# Patient Record
Sex: FEMALE | Race: WHITE | NOT HISPANIC OR LATINO | Employment: PART TIME | URBAN - NONMETROPOLITAN AREA
[De-identification: names, ages, dates, MRNs, and addresses within clinical notes are randomized per-mention and may not be internally consistent; named-entity substitution may affect disease eponyms.]

---

## 2017-03-22 ENCOUNTER — APPOINTMENT (OUTPATIENT)
Dept: URGENT CARE | Facility: PHYSICIAN GROUP | Age: 32
End: 2017-03-22
Payer: MEDICAID

## 2017-03-23 ENCOUNTER — OFFICE VISIT (OUTPATIENT)
Dept: MEDICAL GROUP | Facility: CLINIC | Age: 32
End: 2017-03-23
Payer: MEDICAID

## 2017-03-23 VITALS
HEIGHT: 62 IN | DIASTOLIC BLOOD PRESSURE: 62 MMHG | TEMPERATURE: 98.4 F | OXYGEN SATURATION: 97 % | BODY MASS INDEX: 34.6 KG/M2 | RESPIRATION RATE: 16 BRPM | HEART RATE: 70 BPM | SYSTOLIC BLOOD PRESSURE: 100 MMHG | WEIGHT: 188 LBS

## 2017-03-23 DIAGNOSIS — F15.11 HISTORY OF METHAMPHETAMINE ABUSE (HCC): ICD-10-CM

## 2017-03-23 DIAGNOSIS — E66.9 OBESITY (BMI 30-39.9): ICD-10-CM

## 2017-03-23 DIAGNOSIS — Z76.89 ESTABLISHING CARE WITH NEW DOCTOR, ENCOUNTER FOR: ICD-10-CM

## 2017-03-23 DIAGNOSIS — Z72.0 TOBACCO ABUSE: ICD-10-CM

## 2017-03-23 DIAGNOSIS — G43.009 MIGRAINE WITHOUT AURA AND WITHOUT STATUS MIGRAINOSUS, NOT INTRACTABLE: ICD-10-CM

## 2017-03-23 DIAGNOSIS — F33.1 MODERATE EPISODE OF RECURRENT MAJOR DEPRESSIVE DISORDER (HCC): ICD-10-CM

## 2017-03-23 DIAGNOSIS — F43.9 STRESS AT HOME: ICD-10-CM

## 2017-03-23 DIAGNOSIS — N92.6 IRREGULAR PERIODS/MENSTRUAL CYCLES: ICD-10-CM

## 2017-03-23 DIAGNOSIS — F41.1 GENERALIZED ANXIETY DISORDER: ICD-10-CM

## 2017-03-23 PROBLEM — F32.9 MAJOR DEPRESSIVE DISORDER: Status: ACTIVE | Noted: 2017-03-23

## 2017-03-23 LAB
INT CON NEG: NEGATIVE
INT CON POS: POSITIVE
POC URINE PREGNANCY TEST: NORMAL

## 2017-03-23 PROCEDURE — 96372 THER/PROPH/DIAG INJ SC/IM: CPT | Performed by: NURSE PRACTITIONER

## 2017-03-23 PROCEDURE — 99215 OFFICE O/P EST HI 40 MIN: CPT | Mod: 25 | Performed by: NURSE PRACTITIONER

## 2017-03-23 PROCEDURE — 81025 URINE PREGNANCY TEST: CPT | Performed by: NURSE PRACTITIONER

## 2017-03-23 RX ORDER — KETOROLAC TROMETHAMINE 30 MG/ML
30 INJECTION, SOLUTION INTRAMUSCULAR; INTRAVENOUS ONCE
Status: DISCONTINUED | OUTPATIENT
Start: 2017-03-23 | End: 2017-03-23 | Stop reason: CLARIF

## 2017-03-23 RX ORDER — PAROXETINE HYDROCHLORIDE 20 MG/1
20 TABLET, FILM COATED ORAL DAILY
Qty: 90 TAB | Refills: 1 | Status: SHIPPED | OUTPATIENT
Start: 2017-03-23 | End: 2018-03-30

## 2017-03-23 RX ORDER — KETOROLAC TROMETHAMINE 30 MG/ML
60 INJECTION, SOLUTION INTRAMUSCULAR; INTRAVENOUS ONCE
Status: COMPLETED | OUTPATIENT
Start: 2017-03-23 | End: 2017-03-23

## 2017-03-23 RX ORDER — HYDROXYZINE HYDROCHLORIDE 25 MG/1
25 TABLET, FILM COATED ORAL 3 TIMES DAILY PRN
Qty: 30 TAB | Refills: 2 | Status: SHIPPED | OUTPATIENT
Start: 2017-03-23 | End: 2018-03-30

## 2017-03-23 RX ADMIN — KETOROLAC TROMETHAMINE 60 MG: 30 INJECTION, SOLUTION INTRAMUSCULAR; INTRAVENOUS at 13:50

## 2017-03-23 ASSESSMENT — PATIENT HEALTH QUESTIONNAIRE - PHQ9
CLINICAL INTERPRETATION OF PHQ2 SCORE: 4
5. POOR APPETITE OR OVEREATING: 3 - NEARLY EVERY DAY
SUM OF ALL RESPONSES TO PHQ QUESTIONS 1-9: 20

## 2017-03-23 ASSESSMENT — PAIN SCALES - GENERAL: PAINLEVEL: NO PAIN

## 2017-03-23 NOTE — ASSESSMENT & PLAN NOTE
Patient reports she has a long history of depression. Has been treated in the past with several medications, Paxil seems to have worked the best. Denies current SI/HI. She is particiapting in counseling at her [facility].

## 2017-03-23 NOTE — Clinical Note
3/23/2017    Subject: Action Required to Complete [employer] Activation    Dear Mercy Maloney,    Thank you for enrolling in [employer], a free online tool where you can schedule appointments, request prescription refills, view test results and more. To complete your [employer] activation, please follow the instructions below.     1. Visit [employer]     2. Click “Sign Up Now”    3. Enter activation code: [email or web address]  Expires: 4/22/2017  1:41 PM    4. Follow the instructions on screen to complete the quick, 3-step activation    Once you’ve completed your activation, you’re ready to view your medical record. Please remember, [employer] is not to be used for urgent needs. For medical emergencies, dial 911.    Benefits of [employer]’s secure online tool allows you to manage your health information day or night. [employer] allows you to:    • Schedule and view appointments  • View test results  • Request prescription refills  • Message your healthcare provider  • Keep track of your family’s health  • Review immunization records  • View or download your Summary of Care document    Download the [employer] Wendy   After you’ve created a login by following the steps above, you can download the [employer] wendy for your smartphone for even quicker access. Simply visit the wendy store and search “[employer].”    For questions or assistance with [employer], please call [employer] at [phone].    Sincerely,  Customer Service Team

## 2017-03-23 NOTE — ASSESSMENT & PLAN NOTE
Patient reports a long history of anxiety. She does report that she has a history of substance abuse, she does NOT want any benzodiazepine, which I agree with.

## 2017-03-23 NOTE — PATIENT INSTRUCTIONS
TORADOL     DO NOT TAKE THE PRESCRIBED  UNTIL TOMORROW.  YOU RECEIVED A SHOT OF TORADOL (KETOROLAC) IN THE CLINIC.  TORADOL IS AN NSAID ANTIINFLAMMATORY PAIN MEDICATION.  BECAUSE YOU RECEIVED THE KETOROLAC SHOT TODAY DO NOT TAKE ANY OTHER NSAID ANTIINFLAMMATORY PAIN MEDICATION (FOR EXAMPLE, IBUPROFEN, MOTRIN, ASPIRIN, NAPROXEN, ADVIL, ALLEVE, CELEBREX, PIROXICAM, MELOXICAM) UNTIL TOMORROW.  IF YOU NEED SOME ADDITIONAL PAIN MEDICATION IT IS SAFE TO TAKE ACETAMINOPHEN OR TYLENOL.    Depression, Adult  Depression refers to feeling sad, low, down in the dumps, blue, gloomy, or empty. In general, there are two kinds of depression:  1. Normal sadness or normal grief. This kind of depression is one that we all feel from time to time after upsetting life experiences, such as the loss of a job or the ending of a relationship. This kind of depression is considered normal, is short lived, and resolves within a few days to 2 weeks. Depression experienced after the loss of a loved one (bereavement) often lasts longer than 2 weeks but normally gets better with time.  2. Clinical depression. This kind of depression lasts longer than normal sadness or normal grief or interferes with your ability to function at home, at work, and in school. It also interferes with your personal relationships. It affects almost every aspect of your life. Clinical depression is an illness.  Symptoms of depression can also be caused by conditions other than those mentioned above, such as:  · Physical illness. Some physical illnesses, including underactive thyroid gland (hypothyroidism), severe anemia, specific types of cancer, diabetes, uncontrolled seizures, heart and lung problems, strokes, and chronic pain are commonly associated with symptoms of depression.  · Side effects of some prescription medicine. In some people, certain types of medicine can cause symptoms of depression.  · Substance abuse. Abuse of alcohol and illicit drugs can cause  symptoms of depression.  SYMPTOMS  Symptoms of normal sadness and normal grief include the following:  · Feeling sad or crying for short periods of time.  · Not caring about anything (apathy).  · Difficulty sleeping or sleeping too much.  · No longer able to enjoy the things you used to enjoy.  · Desire to be by oneself all the time (social isolation).  · Lack of energy or motivation.  · Difficulty concentrating or remembering.  · Change in appetite or weight.  · Restlessness or agitation.  Symptoms of clinical depression include the same symptoms of normal sadness or normal grief and also the following symptoms:  · Feeling sad or crying all the time.  · Feelings of guilt or worthlessness.  · Feelings of hopelessness or helplessness.  · Thoughts of suicide or the desire to harm yourself (suicidal ideation).  · Loss of touch with reality (psychotic symptoms). Seeing or hearing things that are not real (hallucinations) or having false beliefs about your life or the people around you (delusions and paranoia).  DIAGNOSIS   The diagnosis of clinical depression is usually based on how bad the symptoms are and how long they have lasted. Your health care provider will also ask you questions about your medical history and substance use to find out if physical illness, use of prescription medicine, or substance abuse is causing your depression. Your health care provider may also order blood tests.  TREATMENT   Often, normal sadness and normal grief do not require treatment. However, sometimes antidepressant medicine is given for bereavement to ease the depressive symptoms until they resolve.  The treatment for clinical depression depends on how bad the symptoms are but often includes antidepressant medicine, counseling with a mental health professional, or both. Your health care provider will help to determine what treatment is best for you.  Depression caused by physical illness usually goes away with appropriate medical  treatment of the illness. If prescription medicine is causing depression, talk with your health care provider about stopping the medicine, decreasing the dose, or changing to another medicine.  Depression caused by the abuse of alcohol or illicit drugs goes away when you stop using these substances. Some adults need professional help in order to stop drinking or using drugs.  SEEK IMMEDIATE MEDICAL CARE IF:  · You have thoughts about hurting yourself or others.  · You lose touch with reality (have psychotic symptoms).  · You are taking medicine for depression and have a serious side effect.  FOR MORE INFORMATION  · National [place] on Mental Illness: www.amy.org   · National [place] of Mental Health: www.nimh.nih.gov      This information is not intended to replace advice given to you by your health care provider. Make sure you discuss any questions you have with your health care provider.     Document Released: 12/15/2001 Document Revised: 01/08/2016 Document Reviewed: 03/18/2013  [employer] Patient Education ©2016 [employer]    Your medical care was provided today by: LYNETTE Edmond    Thank You for the opportunity to serve you.    You may receive a brief survey in the mail shortly regarding your visit today. Please take a few moments to complete the survey and return it; no postage is necessary. We are working to serve our patient population better, improve customer service and our patients overall experience and your input can help us to accomplish this. We thank you for your help and for the opportunity to serve you today and in the future.     Special Instructions:  Always call 9-1-1 immediately if you develop a life threatening emergency.    Unless told otherwise please take all medications as directed and complete prescription therapies.     Watch for the following signs that require additional evaluation: progressive lethargy or unresponsiveness, localized pain (chest, abdomen),  shortness of breath, painful breathing, progressive vomiting with weakness, bloody stools, or new rash.     If you are prescribed pain medication or any other medication that is sedating, do not take medication before or while operating a vehicle or heavy machinery or equipment due to potential side effects such as drowsiness and/or dizziness.

## 2017-03-23 NOTE — ASSESSMENT & PLAN NOTE
Patient reports that recently her stepfather has undergone brain surgery. Her biological father is very ill. She is living with her mother and a large family. She is just having a lot of stress at home recently.

## 2017-03-23 NOTE — PROGRESS NOTES
Chief Complaint   Patient presents with   • Establish Care   • Anxiety     x 5 days   • Headache     x 2 weeks - excedrin migraine x 1.5 weeks        Mercy Maloney is a 31 y.o. female here today to establish care and to discuss the evaluation and management of:    HPI:      Establishing care with new doctor, encounter for  Living here in [place] with her mother. She has a 12 year old boy.     Major depressive disorder  Patient reports she has a long history of depression. Has been treated in the past with several medications, Paxil seems to have worked the best. Denies current SI/HI. She is particiapting in counseling at her [facility].       History of methamphetamine abuse  Clean and sober since 2004.     Irregular periods/menstrual cycles  Patient reports she has an irregular periods and menstrual cycles for several years. She has had 3 pregnancies, one live child. Currently her partner, she is currently engaged, are actively trying to become pregnant.    Tobacco abuse  20+ years of smoking, avg. 1 PPD, down to know about 1/4 PPD. Is thinking about quitting soon.     Migraine without aura and without status migrainosus, not intractable  Patient has a history of migraines, usually treated with Excedrin Migraine with good results. Reports has had a migraine for about 2 weeks, likely related to increased stress. No changes in vision, no aura.    Generalized anxiety disorder  Patient reports a long history of anxiety. She does report that she has a history of substance abuse, she does NOT want any benzodiazepine, which I agree with.    Stress at home  Patient reports that recently her stepfather has undergone brain surgery. Her biological father is very ill. She is living with her mother and a large family. She is just having a lot of stress at home recently.      Current medicines (including changes today)  Current Outpatient Prescriptions   Medication Sig Dispense Refill   • asa/apap/caffeine (EXCEDRIN MIGRAINE)  250-250-65 MG Tab Take 1 Tab by mouth every 6 hours as needed for Headache.     • hydrOXYzine (ATARAX) 25 MG Tab Take 1 Tab by mouth 3 times a day as needed for Anxiety. 30 Tab 2   • paroxetine (PAXIL) 20 MG Tab Take 1 Tab by mouth every day. 90 Tab 1     No current facility-administered medications for this visit.       She  has a past medical history of Asthma; Allergy; Migraine; Depression; Anxiety; ADHD (attention deficit hyperactivity disorder); and PTSD (post-traumatic stress disorder).    She  has past surgical history that includes cholecystectomy.     Social History   Substance Use Topics   • Smoking status: Current Every Day Smoker -- 1.00 packs/day for 20 years     Types: Cigarettes   • Smokeless tobacco: Never Used   • Alcohol Use: No       Social History     Social History Narrative       Family History   Problem Relation Age of Onset   • Diabetes Mother    • Thyroid Mother    • Diabetes Father    • Alcohol/Drug Father    • Psychiatry Sister    • Alcohol/Drug Sister    • Thyroid Brother    • Alcohol/Drug Brother        Family Status   Relation Status Death Age   • Mother Alive    • Father Alive    • Sister Alive    • Brother Alive        ROS   Constitutional: Denies fever, chills, or sweats  Eyes: negative for visual blurring, double vision, eye pain, floaters and discharge from eyes  ENT: negative for tinnitus, vertigo, frequent URI's, sinus trouble, persistent sore throat  Respiratory: negative for persistent cough, hemoptysis, dyspnea, wheezing  Cardiovascular: negative for palpitations, chest pain, or peripheral edema.  Gastrointestinal: negative for poor appetite, dysphagia, nausea, heartburn, abdominal pain, hemorrhoids, constipation or diarrhea  Genitourinary: negative for dysuria. negative for abnormal uterine bleeding, vaginal discharge. Positive for intermittent history of irregular periods.  Musculoskeletal: negative for joint swelling and muscle pain/ soreness  Skin: negative for rash,  "scaling, hair or nail changes.  Neurologic: negative for  involuntary movements or tremor. Positive for migraine today. No aura.  Psychiatric: negative for excessive alcohol consumption or illegal drug usage, positive for trouble falling asleep, due to thoughts. Positive for anxiety depression. No SI/HI.  Hematologic/Lymphatic/Immunologic: negative for unusual bruising, swollen glands  Endocrine: negative for temperature intolerance, polydipsia, polyuria, unintentional weight changes.        Objective:     Blood pressure 100/62, pulse 70, temperature 36.9 °C (98.4 °F), resp. rate 16, height 1.575 m (5' 2.01\"), weight 85.276 kg (188 lb), last menstrual period 12/01/2016, SpO2 97 %, not currently breastfeeding. Body mass index is 34.38 kg/(m^2).    Physical Exam:   Constitutional: Alert, no distress.  Eye: Equal, round and reactive, conjunctiva clear, lids normal.  ENMT: Lips without lesions, good dentition, oropharynx clear.   Neck: Trachea midline, no masses, no thyromegaly. No cervical or supraclavicular lymphadenopathy.   Respiratory: Unlabored respiratory effort, lungs clear to auscultation, no wheezes, no ronchi.  Cardiovascular: Normal S1, S2, no murmur, no edema. Capillary refill < 2 seconds in UE bilaterally.   Abdomen: Soft, non-tender, no masses, no hepatosplenomegaly. Normal bowel sounds.   Skin: Warm, dry, good turgor, no rashes in visible areas.  Neuro: DTR 2+ LE, CN II-XII grossly intact, normal sensation in extremities.   Psych: Alert and oriented x3, normal affect and mood.      Assessment and Plan:   The following treatment plan was discussed    1. Establishing care with new doctor, encounter for  Encouraged to make a separate appointment for Pap smear, she is due.    2. Obesity (BMI 30-39.9)  - Patient identified as having weight management issue.  Appropriate orders and counseling given.    3. Moderate episode of recurrent major depressive disorder (CMS-HCC)  Advised to obtain screening labs. Based " on her history we'll start Paxil 20 mg daily. Cautioned patient on pregnancy category of medication, may consider Celexa in the future if she becomes pregnant. Today she would like to start Paxil since that worked best for her and she will discontinue if she becomes pregnant. Also advised to discontinue hydroxyzine if she becomes pregnant. She is seeing counselor. She appears to have good support and be in good spirits today. She is also very self-aware which is great.  - Patient has been identified as being depressed and appropriate orders and counseling have been given  - LIPID PROFILE; Future  - COMP METABOLIC PANEL; Future  - CBC WITH DIFFERENTIAL; Future  - VITAMIN D,25 HYDROXY; Future  - TRIIDOTHYRONINE; Future  - THYROID PEROXIDASE  (TPO) AB; Future    4. Tobacco abuse  Advised to cut back by one cigarette per month, may consider patches in the future.    5. Migraine without aura and without status migrainosus, not intractable  Patient had good relief with Toradol. Likely ongoing migraine related to stress. Advised to stay hydrated. Discussed signs and symptoms seek emergent care.  - ketorolac (TORADOL) injection 60 mg; 2 mL by Intramuscular route Once.    6. Generalized anxiety disorder  We'll trial hydroxyzine up to 3 times daily as needed for anxiety. Advised to take at home, may make her drowsy.  - TSH WITH REFLEX TO FT4; Future    7. Stress at home  Provided reassurance.    8. Irregular periods/menstrual cycles  Pregnancy test is negative today. Advised patient to start on prenatal vitamin. May discuss irregular periods next appointment, possible PCOS?   - POCT Pregnancy    9. History of methamphetamine abuse  Clean and sober since 2004. She is getting dentures.       Reviewed indication, dosage, usage and potential adverse effects of prescribed medications. Patient appears to understand, verbalizes understanding and is willing to try medications as prescribed.      Reviewed risks and benefits of  treatment plan. Patient verbally agrees to plan of care.     Records requested.    Followup: Return for Short, depression/anxiety .    SCHUYLER Jain.     PLEASE NOTE: This dictation was created using voice recognition software. I have made every reasonable attempt to correct obvious errors, but I expect that there may be errors of grammar and possibly content that I did not discover prior finalizing this note.

## 2017-03-23 NOTE — MR AVS SNAPSHOT
"        Mercy Maloney   3/23/2017 1:00 PM   Office Visit   MRN: [id number]    Department:  [facility] Phone:  [phone]    Description:  Female : 1985   Provider:  PATIENCE Jain           Reason for Visit     Establish Care     Anxiety x 5 days    Headache x 2 weeks - excedrin migraine x 1.5 weeks      Allergies as of 3/23/2017     No Known Allergies      You were diagnosed with     Establishing care with new doctor, encounter for   [267008]       Obesity (BMI 30-39.9)   [229647]       Moderate episode of recurrent major depressive disorder (CMS-HCC)   [6571362]       Tobacco abuse   [469346]       Migraine without aura and without status migrainosus, not intractable   [398700]       Generalized anxiety disorder   [300.02.ICD-9-CM]       Stress at home   [658945]       Irregular periods/menstrual cycles   [446518]       History of methamphetamine abuse   [939800]         Vital Signs     Blood Pressure Pulse Temperature Respirations Height Weight    100/62 mmHg 70 36.9 °C (98.4 °F) 16 1.575 m (5' 2.01\") 85.276 kg (188 lb)    Body Mass Index Oxygen Saturation Last Menstrual Period Breastfeeding? Smoking Status       34.38 kg/m2 97% 2016 No Current Every Day Smoker       Basic Information     Date Of Birth Sex Race Ethnicity Preferred Language    1985 Female White Non- English      Problem List              ICD-10-CM Priority Class Noted - Resolved    Obesity (BMI 30-39.9) E66.9   3/23/2017 - Present    Major depressive disorder F32.9   3/23/2017 - Present    Tobacco abuse Z72.0   3/23/2017 - Present    Migraine without aura and without status migrainosus, not intractable G43.009   3/23/2017 - Present    Generalized anxiety disorder F41.1   3/23/2017 - Present    Stress at home F43.9   3/23/2017 - Present    Establishing care with new doctor, encounter for Z71.89   3/23/2017 - Present    Irregular periods/menstrual cycles N92.6   3/23/2017 - Present    History of " methamphetamine abuse Z87.898   3/23/2017 - Present      Health Maintenance        Date Due Completion Dates    IMM PNEUMOCOCCAL 19-64 (ADULT) MEDIUM RISK SERIES (1 of 1 - PPSV23) 6/26/2003 ---    PAP SMEAR 6/26/2005 ---    IMM INFLUENZA (1) 9/1/2016 ---    IMM DTaP/Tdap/Td Vaccine (2 - Td) 4/1/2024 4/1/2014            Results     POCT Pregnancy      Component Value Standard Range & Units    POC Urine Pregnancy Test neg Negative    Internal Control Positive Positive     Internal Control Negative Negative                         Current Immunizations     Tdap Vaccine 4/1/2014      Below and/or attached are the medications your provider expects you to take. Review all of your home medications and newly ordered medications with your provider and/or pharmacist. Follow medication instructions as directed by your provider and/or pharmacist. Please keep your medication list with you and share with your provider. Update the information when medications are discontinued, doses are changed, or new medications (including over-the-counter products) are added; and carry medication information at all times in the event of emergency situations     Allergies:  No Known Allergies          Medications  Valid as of: March 23, 2017 -  1:59 PM    Generic Name Brand Name Tablet Size Instructions for use    Aspirin-Acetaminophen-Caffeine (Tab) EXCEDRIN 250-250-65 MG Take 1 Tab by mouth every 6 hours as needed for Headache.        HydrOXYzine HCl (Tab) ATARAX 25 MG Take 1 Tab by mouth 3 times a day as needed for Anxiety.        PARoxetine HCl (Tab) PAXIL 20 MG Take 1 Tab by mouth every day.        .                 Medicines prescribed today were sent to:     [id number]  [place] - [address] AT [facility] [address] [place]    [address] [place] [address]    Phone: [phone] Fax: [phone]    Open 24 Hours?: No      Medication refill instructions:       If your prescription bottle indicates  you have medication refills left, it is not necessary to call your provider’s office. Please contact your pharmacy and they will refill your medication.    If your prescription bottle indicates you do not have any refills left, you may request refills at any time through one of the following ways: The online [employer] system (except Urgent Care), by calling your provider’s office, or by asking your pharmacy to contact your provider’s office with a refill request. Medication refills are processed only during regular business hours and may not be available until the next business day. Your provider may request additional information or to have a follow-up visit with you prior to refilling your medication.   *Please Note: Medication refills are assigned a new Rx number when refilled electronically. Your pharmacy may indicate that no refills were authorized even though a new prescription for the same medication is available at the pharmacy. Please request the medicine by name with the pharmacy before contacting your provider for a refill.        Your To Do List     Future Labs/Procedures Complete By Expires    CBC WITH DIFFERENTIAL  As directed 3/24/2018    COMP METABOLIC PANEL  As directed 3/24/2018    LIPID PROFILE  As directed 3/24/2018    THYROID PEROXIDASE  (TPO) AB  As directed 3/24/2018    TRIIDOTHYRONINE  As directed 3/23/2018    TSH WITH REFLEX TO FT4  As directed 3/23/2018    VITAMIN D,25 HYDROXY  As directed 3/24/2018      Instructions    TORADOL     DO NOT TAKE THE PRESCRIBED  UNTIL TOMORROW.  YOU RECEIVED A SHOT OF TORADOL (KETOROLAC) IN THE CLINIC.  TORADOL IS AN NSAID ANTIINFLAMMATORY PAIN MEDICATION.  BECAUSE YOU RECEIVED THE KETOROLAC SHOT TODAY DO NOT TAKE ANY OTHER NSAID ANTIINFLAMMATORY PAIN MEDICATION (FOR EXAMPLE, IBUPROFEN, MOTRIN, ASPIRIN, NAPROXEN, ADVIL, ALLEVE, CELEBREX, PIROXICAM, MELOXICAM) UNTIL TOMORROW.  IF YOU NEED SOME ADDITIONAL PAIN MEDICATION IT IS SAFE TO TAKE ACETAMINOPHEN OR  TYLENOL.    Depression, Adult  Depression refers to feeling sad, low, down in the dumps, blue, gloomy, or empty. In general, there are two kinds of depression:  1. Normal sadness or normal grief. This kind of depression is one that we all feel from time to time after upsetting life experiences, such as the loss of a job or the ending of a relationship. This kind of depression is considered normal, is short lived, and resolves within a few days to 2 weeks. Depression experienced after the loss of a loved one (bereavement) often lasts longer than 2 weeks but normally gets better with time.  2. Clinical depression. This kind of depression lasts longer than normal sadness or normal grief or interferes with your ability to function at home, at work, and in school. It also interferes with your personal relationships. It affects almost every aspect of your life. Clinical depression is an illness.  Symptoms of depression can also be caused by conditions other than those mentioned above, such as:  · Physical illness. Some physical illnesses, including underactive thyroid gland (hypothyroidism), severe anemia, specific types of cancer, diabetes, uncontrolled seizures, heart and lung problems, strokes, and chronic pain are commonly associated with symptoms of depression.  · Side effects of some prescription medicine. In some people, certain types of medicine can cause symptoms of depression.  · Substance abuse. Abuse of alcohol and illicit drugs can cause symptoms of depression.  SYMPTOMS  Symptoms of normal sadness and normal grief include the following:  · Feeling sad or crying for short periods of time.  · Not caring about anything (apathy).  · Difficulty sleeping or sleeping too much.  · No longer able to enjoy the things you used to enjoy.  · Desire to be by oneself all the time (social isolation).  · Lack of energy or motivation.  · Difficulty concentrating or remembering.  · Change in appetite or weight.  · Restlessness  or agitation.  Symptoms of clinical depression include the same symptoms of normal sadness or normal grief and also the following symptoms:  · Feeling sad or crying all the time.  · Feelings of guilt or worthlessness.  · Feelings of hopelessness or helplessness.  · Thoughts of suicide or the desire to harm yourself (suicidal ideation).  · Loss of touch with reality (psychotic symptoms). Seeing or hearing things that are not real (hallucinations) or having false beliefs about your life or the people around you (delusions and paranoia).  DIAGNOSIS   The diagnosis of clinical depression is usually based on how bad the symptoms are and how long they have lasted. Your health care provider will also ask you questions about your medical history and substance use to find out if physical illness, use of prescription medicine, or substance abuse is causing your depression. Your health care provider may also order blood tests.  TREATMENT   Often, normal sadness and normal grief do not require treatment. However, sometimes antidepressant medicine is given for bereavement to ease the depressive symptoms until they resolve.  The treatment for clinical depression depends on how bad the symptoms are but often includes antidepressant medicine, counseling with a mental health professional, or both. Your health care provider will help to determine what treatment is best for you.  Depression caused by physical illness usually goes away with appropriate medical treatment of the illness. If prescription medicine is causing depression, talk with your health care provider about stopping the medicine, decreasing the dose, or changing to another medicine.  Depression caused by the abuse of alcohol or illicit drugs goes away when you stop using these substances. Some adults need professional help in order to stop drinking or using drugs.  SEEK IMMEDIATE MEDICAL CARE IF:  · You have thoughts about hurting yourself or others.  · You lose touch  with reality (have psychotic symptoms).  · You are taking medicine for depression and have a serious side effect.  FOR MORE INFORMATION  · National [place] on Mental Illness: www.aym.org   · National [place] of Mental Health: www.nimh.nih.gov      This information is not intended to replace advice given to you by your health care provider. Make sure you discuss any questions you have with your health care provider.     Document Released: 12/15/2001 Document Revised: 01/08/2016 Document Reviewed: 03/18/2013  [employer] Interactive Patient Education ©2016 [employer]    Your medical care was provided today by: LYNETTE Edmond    Thank You for the opportunity to serve you.    You may receive a brief survey in the mail shortly regarding your visit today. Please take a few moments to complete the survey and return it; no postage is necessary. We are working to serve our patient population better, improve customer service and our patients overall experience and your input can help us to accomplish this. We thank you for your help and for the opportunity to serve you today and in the future.     Special Instructions:  Always call 9-1-1 immediately if you develop a life threatening emergency.    Unless told otherwise please take all medications as directed and complete prescription therapies.     Watch for the following signs that require additional evaluation: progressive lethargy or unresponsiveness, localized pain (chest, abdomen), shortness of breath, painful breathing, progressive vomiting with weakness, bloody stools, or new rash.     If you are prescribed pain medication or any other medication that is sedating, do not take medication before or while operating a vehicle or heavy machinery or equipment due to potential side effects such as drowsiness and/or dizziness.              [employer] Access Code: Activation code not generated  Current Rafter Status: Active          Quit Tobacco Information     Do you  want to quit using tobacco?    Quitting tobacco decreases risks of cancer, heart and lung disease, increases life expectancy, improves sense of taste and smell, and increases spending money, among other benefits.    If you are thinking about quitting, we can help.  • [facility] Quit Tobacco Program: [phone]  o Program occurs weekly for four weeks and includes pharmacist consultation on products to support quitting smoking or chewing tobacco. A provider referral is needed for pharmacist consultation.  • Tobacco Users Help Hotline: 7-949-QUIT-NOW (138-4592) or https://[place].quitlogix.org/  o Free, confidential telephone and online coaching for [place] residents. Sessions are designed on a schedule that is convenient for you. Eligible clients receive free nicotine replacement therapy.  • Nationally: www.smokefree.gov  o Information and professional assistance to support both immediate and long-term needs as you become, and remain, a non-smoker. Smokefree.gov allows you to choose the help that best fits your needs.

## 2017-03-23 NOTE — ASSESSMENT & PLAN NOTE
Patient has a history of migraines, usually treated with Excedrin Migraine with good results. Reports has had a migraine for about 2 weeks, likely related to increased stress. No changes in vision, no aura.

## 2017-03-23 NOTE — ASSESSMENT & PLAN NOTE
Patient reports she has an irregular periods and menstrual cycles for several years. She has had 3 pregnancies, one live child. Currently her partner, she is currently engaged, are actively trying to become pregnant.

## 2018-03-14 ENCOUNTER — OFFICE VISIT (OUTPATIENT)
Dept: URGENT CARE | Facility: PHYSICIAN GROUP | Age: 33
End: 2018-03-14
Payer: MEDICAID

## 2018-03-14 ENCOUNTER — HOSPITAL ENCOUNTER (OUTPATIENT)
Dept: LAB | Facility: MEDICAL CENTER | Age: 33
End: 2018-03-14
Attending: PHYSICIAN ASSISTANT
Payer: MEDICAID

## 2018-03-14 ENCOUNTER — APPOINTMENT (OUTPATIENT)
Dept: RADIOLOGY | Facility: IMAGING CENTER | Age: 33
End: 2018-03-14
Attending: PHYSICIAN ASSISTANT
Payer: MEDICAID

## 2018-03-14 VITALS
SYSTOLIC BLOOD PRESSURE: 118 MMHG | TEMPERATURE: 97.9 F | BODY MASS INDEX: 31.83 KG/M2 | HEART RATE: 64 BPM | DIASTOLIC BLOOD PRESSURE: 64 MMHG | RESPIRATION RATE: 20 BRPM | HEIGHT: 62 IN | OXYGEN SATURATION: 100 % | WEIGHT: 173 LBS

## 2018-03-14 DIAGNOSIS — R07.9 CHEST PAIN, UNSPECIFIED TYPE: ICD-10-CM

## 2018-03-14 LAB
ALBUMIN SERPL BCP-MCNC: 4.6 G/DL (ref 3.2–4.9)
ALBUMIN/GLOB SERPL: 1.8 G/DL
ALP SERPL-CCNC: 55 U/L (ref 30–99)
ALT SERPL-CCNC: 18 U/L (ref 2–50)
ANION GAP SERPL CALC-SCNC: 7 MMOL/L (ref 0–11.9)
AST SERPL-CCNC: 14 U/L (ref 12–45)
BASOPHILS # BLD AUTO: 0.7 % (ref 0–1.8)
BASOPHILS # BLD: 0.06 K/UL (ref 0–0.12)
BILIRUB SERPL-MCNC: 0.4 MG/DL (ref 0.1–1.5)
BUN SERPL-MCNC: 15 MG/DL (ref 8–22)
CALCIUM SERPL-MCNC: 9.2 MG/DL (ref 8.5–10.5)
CHLORIDE SERPL-SCNC: 106 MMOL/L (ref 96–112)
CO2 SERPL-SCNC: 26 MMOL/L (ref 20–33)
CREAT SERPL-MCNC: 0.74 MG/DL (ref 0.5–1.4)
DEPRECATED D DIMER PPP IA-ACNC: <200 NG/ML(D-DU)
EOSINOPHIL # BLD AUTO: 0.05 K/UL (ref 0–0.51)
EOSINOPHIL NFR BLD: 0.6 % (ref 0–6.9)
ERYTHROCYTE [DISTWIDTH] IN BLOOD BY AUTOMATED COUNT: 46.4 FL (ref 35.9–50)
GLOBULIN SER CALC-MCNC: 2.6 G/DL (ref 1.9–3.5)
GLUCOSE SERPL-MCNC: 118 MG/DL (ref 65–99)
HCT VFR BLD AUTO: 41.8 % (ref 37–47)
HGB BLD-MCNC: 13.5 G/DL (ref 12–16)
IMM GRANULOCYTES # BLD AUTO: 0.02 K/UL (ref 0–0.11)
IMM GRANULOCYTES NFR BLD AUTO: 0.2 % (ref 0–0.9)
LIPASE SERPL-CCNC: 10 U/L (ref 11–82)
LYMPHOCYTES # BLD AUTO: 2.37 K/UL (ref 1–4.8)
LYMPHOCYTES NFR BLD: 27.1 % (ref 22–41)
MCH RBC QN AUTO: 29.6 PG (ref 27–33)
MCHC RBC AUTO-ENTMCNC: 32.3 G/DL (ref 33.6–35)
MCV RBC AUTO: 91.7 FL (ref 81.4–97.8)
MONOCYTES # BLD AUTO: 0.5 K/UL (ref 0–0.85)
MONOCYTES NFR BLD AUTO: 5.7 % (ref 0–13.4)
NEUTROPHILS # BLD AUTO: 5.76 K/UL (ref 2–7.15)
NEUTROPHILS NFR BLD: 65.7 % (ref 44–72)
NRBC # BLD AUTO: 0 K/UL
NRBC BLD-RTO: 0 /100 WBC
PLATELET # BLD AUTO: 266 K/UL (ref 164–446)
PMV BLD AUTO: 10.9 FL (ref 9–12.9)
POTASSIUM SERPL-SCNC: 4 MMOL/L (ref 3.6–5.5)
PROT SERPL-MCNC: 7.2 G/DL (ref 6–8.2)
RBC # BLD AUTO: 4.56 M/UL (ref 4.2–5.4)
SODIUM SERPL-SCNC: 139 MMOL/L (ref 135–145)
TSH SERPL DL<=0.005 MIU/L-ACNC: 1.49 UIU/ML (ref 0.38–5.33)
WBC # BLD AUTO: 8.8 K/UL (ref 4.8–10.8)

## 2018-03-14 PROCEDURE — 85379 FIBRIN DEGRADATION QUANT: CPT

## 2018-03-14 PROCEDURE — 85025 COMPLETE CBC W/AUTO DIFF WBC: CPT

## 2018-03-14 PROCEDURE — 71046 X-RAY EXAM CHEST 2 VIEWS: CPT | Performed by: FAMILY MEDICINE

## 2018-03-14 PROCEDURE — 83690 ASSAY OF LIPASE: CPT

## 2018-03-14 PROCEDURE — 36415 COLL VENOUS BLD VENIPUNCTURE: CPT

## 2018-03-14 PROCEDURE — 84443 ASSAY THYROID STIM HORMONE: CPT

## 2018-03-14 PROCEDURE — 80053 COMPREHEN METABOLIC PANEL: CPT

## 2018-03-14 PROCEDURE — 99214 OFFICE O/P EST MOD 30 MIN: CPT | Performed by: PHYSICIAN ASSISTANT

## 2018-03-14 ASSESSMENT — ENCOUNTER SYMPTOMS
SHORTNESS OF BREATH: 1
SPUTUM PRODUCTION: 1
IRREGULAR HEARTBEAT: 1
COUGH: 1
WHEEZING: 0
FEVER: 0
CHILLS: 0
PALPITATIONS: 1
HEMOPTYSIS: 0

## 2018-03-14 NOTE — PROGRESS NOTES
Subjective:      Mercy Maloney is a 32 y.o. female who presents with Chest Pain (Shaking s/p albuterol treatment;)            Chest Pain    This is a new problem. The current episode started 1 to 4 weeks ago. The problem occurs intermittently. The problem has been unchanged. The pain is present in the substernal region. The pain is moderate. The quality of the pain is described as tightness. The pain radiates to the mid back. Associated symptoms include a cough, irregular heartbeat, palpitations, shortness of breath and sputum production. Pertinent negatives include no fever, hemoptysis or malaise/fatigue. Nothing relieves the cough. The pain is aggravated by nothing. She has tried nothing for the symptoms. Risk factors include substance abuse, stress, sedentary lifestyle and smoking/tobacco exposure.   Her past medical history is significant for anxiety/panic attacks.   Pertinent negatives for past medical history include no DVT and no thyroid problem.       Review of Systems   Constitutional: Negative for chills, fever and malaise/fatigue.   HENT: Negative for congestion and ear pain.    Respiratory: Positive for cough, sputum production and shortness of breath. Negative for hemoptysis and wheezing.    Cardiovascular: Positive for chest pain and palpitations.   All other systems reviewed and are negative.    PMH:  has a past medical history of ADHD (attention deficit hyperactivity disorder); Allergy; Anxiety; Asthma; Depression; Migraine; and PTSD (post-traumatic stress disorder).  MEDS:   Current Outpatient Prescriptions:   •  asa/apap/caffeine (EXCEDRIN MIGRAINE) 250-250-65 MG Tab, Take 1 Tab by mouth every 6 hours as needed for Headache., Disp: , Rfl:   •  hydrOXYzine (ATARAX) 25 MG Tab, Take 1 Tab by mouth 3 times a day as needed for Anxiety., Disp: 30 Tab, Rfl: 2  •  paroxetine (PAXIL) 20 MG Tab, Take 1 Tab by mouth every day., Disp: 90 Tab, Rfl: 1  •  Prenatal Vit-Fe Fumarate-FA (PRENATABS FA) Tab, Take 1 Tab  "by mouth every day., Disp: 30 Tab, Rfl: 11  ALLERGIES: No Known Allergies  SURGHX:   Past Surgical History:   Procedure Laterality Date   • CHOLECYSTECTOMY       SOCHX:  reports that she has been smoking Cigarettes.  She has a 10.00 pack-year smoking history. She has never used smokeless tobacco. She reports that she drinks alcohol. She reports that she does not use drugs.  FH: Family history was reviewed, no pertinent findings to report  Medications, Allergies, and current problem list reviewed today in Epic       Objective:     /64   Pulse 64   Temp 36.6 °C (97.9 °F)   Resp 20   Ht 1.575 m (5' 2\")   Wt 78.5 kg (173 lb)   SpO2 100%   BMI 31.64 kg/m²      Physical Exam   Constitutional: She is oriented to person, place, and time. She appears well-developed and well-nourished. She is active.  Non-toxic appearance. She does not have a sickly appearance. She does not appear ill. No distress. She is not intubated.   HENT:   Head: Normocephalic and atraumatic.   Right Ear: Hearing, tympanic membrane, external ear and ear canal normal.   Left Ear: Hearing, tympanic membrane, external ear and ear canal normal.   Nose: Nose normal.   Mouth/Throat: Uvula is midline, oropharynx is clear and moist and mucous membranes are normal.   Eyes: Conjunctivae, EOM and lids are normal.   Neck: Normal range of motion. Neck supple.   Cardiovascular: Regular rhythm, S1 normal, S2 normal and normal heart sounds.  Exam reveals no gallop and no friction rub.    No murmur heard.  Pulmonary/Chest: Effort normal and breath sounds normal. No accessory muscle usage. No apnea, no tachypnea and no bradypnea. She is not intubated. No respiratory distress. She has no decreased breath sounds. She has no wheezes. She has no rhonchi. She has no rales. She exhibits no tenderness.   Musculoskeletal: Normal range of motion.   Neurological: She is alert and oriented to person, place, and time.   Skin: Skin is warm and dry.   Psychiatric: She has " a normal mood and affect. Her speech is normal and behavior is normal. Judgment and thought content normal.   Vitals reviewed.         EKG: NSR rate: 55  , normal axis, normal intervals, no evidence of ischemia or hypertrophy.       3/14/2018 11:56 AM    HISTORY/REASON FOR EXAM:  Chest Pain.      TECHNIQUE/EXAM DESCRIPTION AND NUMBER OF VIEWS:  Two views of the chest.    COMPARISON:  None.    FINDINGS:  The heart is normal in size.  No pulmonary infiltrates or consolidations are noted.  No pleural effusions are appreciated.     Impression       No active disease.       Assessment/Plan:   Patient is a 32-year-old female who presents with chest pain shortness of breath ongoing for 7 days. She states this is problem for the past year. She recently stopped methamphetamines. She does not take birth control but is an every day smoker. Vital signs are normal. Lungs are clear to auscultation EKG shows normal sinus rhythm. X-ray is negative. She is at low risk for PE per well's criteria and the son are no red flags to send her to the emergency room. We will get labs to evaluate other possible etiologies. Most likely stress versus withdrawal symptoms.  1. Chest pain, unspecified type  DX-CHEST-2 VIEWS    CBC WITH DIFFERENTIAL    COMP METABOLIC PANEL    TSH    LIPASE    D-DIMER         Differential diagnosis, natural history, supportive care discussed. Follow-up with primary care provider within 7-10 days, emergency room precautions discussed.  Patient and/or family appears understanding of information.

## 2018-03-21 ENCOUNTER — HOSPITAL ENCOUNTER (OUTPATIENT)
Dept: LAB | Facility: MEDICAL CENTER | Age: 33
End: 2018-03-21
Attending: NURSE PRACTITIONER
Payer: MEDICAID

## 2018-03-21 DIAGNOSIS — F33.1 MODERATE EPISODE OF RECURRENT MAJOR DEPRESSIVE DISORDER (HCC): ICD-10-CM

## 2018-03-21 DIAGNOSIS — F41.1 GENERALIZED ANXIETY DISORDER: ICD-10-CM

## 2018-03-21 LAB
25(OH)D3 SERPL-MCNC: 21 NG/ML (ref 30–100)
ALBUMIN SERPL BCP-MCNC: 4.5 G/DL (ref 3.2–4.9)
ALBUMIN/GLOB SERPL: 1.6 G/DL
ALP SERPL-CCNC: 58 U/L (ref 30–99)
ALT SERPL-CCNC: 16 U/L (ref 2–50)
ANION GAP SERPL CALC-SCNC: 6 MMOL/L (ref 0–11.9)
AST SERPL-CCNC: 15 U/L (ref 12–45)
BASOPHILS # BLD AUTO: 0.6 % (ref 0–1.8)
BASOPHILS # BLD: 0.04 K/UL (ref 0–0.12)
BILIRUB SERPL-MCNC: 0.6 MG/DL (ref 0.1–1.5)
BUN SERPL-MCNC: 13 MG/DL (ref 8–22)
CALCIUM SERPL-MCNC: 9.9 MG/DL (ref 8.5–10.5)
CHLORIDE SERPL-SCNC: 104 MMOL/L (ref 96–112)
CHOLEST SERPL-MCNC: 160 MG/DL (ref 100–199)
CO2 SERPL-SCNC: 27 MMOL/L (ref 20–33)
CREAT SERPL-MCNC: 0.76 MG/DL (ref 0.5–1.4)
EOSINOPHIL # BLD AUTO: 0.06 K/UL (ref 0–0.51)
EOSINOPHIL NFR BLD: 0.9 % (ref 0–6.9)
ERYTHROCYTE [DISTWIDTH] IN BLOOD BY AUTOMATED COUNT: 43.9 FL (ref 35.9–50)
GLOBULIN SER CALC-MCNC: 2.9 G/DL (ref 1.9–3.5)
GLUCOSE SERPL-MCNC: 76 MG/DL (ref 65–99)
HCT VFR BLD AUTO: 42.7 % (ref 37–47)
HDLC SERPL-MCNC: 61 MG/DL
HGB BLD-MCNC: 14.5 G/DL (ref 12–16)
IMM GRANULOCYTES # BLD AUTO: 0.01 K/UL (ref 0–0.11)
IMM GRANULOCYTES NFR BLD AUTO: 0.1 % (ref 0–0.9)
LDLC SERPL CALC-MCNC: 84 MG/DL
LYMPHOCYTES # BLD AUTO: 1.96 K/UL (ref 1–4.8)
LYMPHOCYTES NFR BLD: 27.9 % (ref 22–41)
MCH RBC QN AUTO: 30.6 PG (ref 27–33)
MCHC RBC AUTO-ENTMCNC: 34 G/DL (ref 33.6–35)
MCV RBC AUTO: 90.1 FL (ref 81.4–97.8)
MONOCYTES # BLD AUTO: 0.62 K/UL (ref 0–0.85)
MONOCYTES NFR BLD AUTO: 8.8 % (ref 0–13.4)
NEUTROPHILS # BLD AUTO: 4.34 K/UL (ref 2–7.15)
NEUTROPHILS NFR BLD: 61.7 % (ref 44–72)
NRBC # BLD AUTO: 0 K/UL
NRBC BLD-RTO: 0 /100 WBC
PLATELET # BLD AUTO: 288 K/UL (ref 164–446)
PMV BLD AUTO: 10.7 FL (ref 9–12.9)
POTASSIUM SERPL-SCNC: 4.5 MMOL/L (ref 3.6–5.5)
PROT SERPL-MCNC: 7.4 G/DL (ref 6–8.2)
RBC # BLD AUTO: 4.74 M/UL (ref 4.2–5.4)
SODIUM SERPL-SCNC: 137 MMOL/L (ref 135–145)
T3 SERPL-MCNC: 112.1 NG/DL (ref 60–181)
THYROPEROXIDASE AB SERPL-ACNC: 1.7 IU/ML (ref 0–9)
TRIGL SERPL-MCNC: 74 MG/DL (ref 0–149)
TSH SERPL DL<=0.005 MIU/L-ACNC: 1.05 UIU/ML (ref 0.38–5.33)
WBC # BLD AUTO: 7 K/UL (ref 4.8–10.8)

## 2018-03-21 PROCEDURE — 86376 MICROSOMAL ANTIBODY EACH: CPT

## 2018-03-21 PROCEDURE — 36415 COLL VENOUS BLD VENIPUNCTURE: CPT

## 2018-03-21 PROCEDURE — 85025 COMPLETE CBC W/AUTO DIFF WBC: CPT

## 2018-03-21 PROCEDURE — 82306 VITAMIN D 25 HYDROXY: CPT

## 2018-03-21 PROCEDURE — 84480 ASSAY TRIIODOTHYRONINE (T3): CPT

## 2018-03-21 PROCEDURE — 80053 COMPREHEN METABOLIC PANEL: CPT

## 2018-03-21 PROCEDURE — 84443 ASSAY THYROID STIM HORMONE: CPT

## 2018-03-21 PROCEDURE — 80061 LIPID PANEL: CPT

## 2018-03-30 ENCOUNTER — OFFICE VISIT (OUTPATIENT)
Dept: MEDICAL GROUP | Facility: CLINIC | Age: 33
End: 2018-03-30
Payer: MEDICAID

## 2018-03-30 VITALS
DIASTOLIC BLOOD PRESSURE: 60 MMHG | RESPIRATION RATE: 18 BRPM | HEIGHT: 62 IN | WEIGHT: 175 LBS | BODY MASS INDEX: 32.2 KG/M2 | OXYGEN SATURATION: 99 % | HEART RATE: 72 BPM | SYSTOLIC BLOOD PRESSURE: 110 MMHG | TEMPERATURE: 99.5 F

## 2018-03-30 DIAGNOSIS — Z72.0 TOBACCO ABUSE: ICD-10-CM

## 2018-03-30 DIAGNOSIS — E55.9 VITAMIN D DEFICIENCY: ICD-10-CM

## 2018-03-30 DIAGNOSIS — Z23 NEED FOR VACCINATION: ICD-10-CM

## 2018-03-30 DIAGNOSIS — F33.1 MODERATE EPISODE OF RECURRENT MAJOR DEPRESSIVE DISORDER (HCC): ICD-10-CM

## 2018-03-30 DIAGNOSIS — F41.1 GENERALIZED ANXIETY DISORDER: ICD-10-CM

## 2018-03-30 DIAGNOSIS — F15.10 METHAMPHETAMINE ABUSE (HCC): ICD-10-CM

## 2018-03-30 DIAGNOSIS — R07.82 INTERCOSTAL PAIN: ICD-10-CM

## 2018-03-30 DIAGNOSIS — E66.9 OBESITY (BMI 30-39.9): ICD-10-CM

## 2018-03-30 PROBLEM — Z76.89 ESTABLISHING CARE WITH NEW DOCTOR, ENCOUNTER FOR: Status: RESOLVED | Noted: 2017-03-23 | Resolved: 2018-03-30

## 2018-03-30 PROCEDURE — 90732 PPSV23 VACC 2 YRS+ SUBQ/IM: CPT | Performed by: NURSE PRACTITIONER

## 2018-03-30 PROCEDURE — 90471 IMMUNIZATION ADMIN: CPT | Performed by: NURSE PRACTITIONER

## 2018-03-30 PROCEDURE — 99214 OFFICE O/P EST MOD 30 MIN: CPT | Mod: 25 | Performed by: NURSE PRACTITIONER

## 2018-03-30 RX ORDER — ERGOCALCIFEROL 1.25 MG/1
50000 CAPSULE ORAL
Qty: 12 CAP | Refills: 0 | Status: SHIPPED | OUTPATIENT
Start: 2018-03-30 | End: 2018-06-01

## 2018-03-30 ASSESSMENT — PATIENT HEALTH QUESTIONNAIRE - PHQ9
5. POOR APPETITE OR OVEREATING: 0 - NOT AT ALL
CLINICAL INTERPRETATION OF PHQ2 SCORE: 2
SUM OF ALL RESPONSES TO PHQ QUESTIONS 1-9: 3

## 2018-03-30 NOTE — ASSESSMENT & PLAN NOTE
This is a chronic problem. Patient reports a long history of anxiety. She does report that she has a history of substance abuse, she does NOT want any benzodiazepine, which I agree with. She has since d/c hydroxyzine, reports that she seemed to be coping well with therapy.

## 2018-03-30 NOTE — PROGRESS NOTES
Subjective:     Mercy Maloney is a 32 y.o. female here today for evaluation and management of the following:     Vitamin D deficiency  This is a new problem. Most recent Vit D low.     Tobacco abuse  20+ years of smoking, avg. 1 PPD, she is not ready to quit     Methamphetamine abuse  Patient had reported Clean and sober since 2004, however, reports relapse in late Feb 2008 due to stress. Denies any IV drug use. She is now living at home with her parents which is a safe and supportive place per her report.     Major depressive disorder  This is a chronic problem. Patient reports she has a long history of depression. Has been treated in the past with several medications, Paxil seems to have worked the best, but she has d/c the medication. Denies current SI/HI. She is particiapting in counseling at her [employer]. She is also followed by [facility].       Intercostal pain  This is a new problem. She was seen and evaluated recently at urgent care. ECG normal. Chest was attributed to recent drug use and anxiety. Patient reports she continues to have intermittent pains. She sometimes has pain when she inhales deeply. Denies fever, although appears low grade today, no chills.     Generalized anxiety disorder  This is a chronic problem. Patient reports a long history of anxiety. She does report that she has a history of substance abuse, she does NOT want any benzodiazepine, which I agree with. She has since d/c hydroxyzine, reports that she seemed to be coping well with therapy.        Current medicines (including changes today)  Current Outpatient Prescriptions   Medication Sig Dispense Refill   • vitamin D, Ergocalciferol, (DRISDOL) 63586 units Cap capsule Take 1 Cap by mouth every 7 days. 12 Cap 0     No current facility-administered medications for this visit.        She  has a past medical history of ADHD (attention deficit hyperactivity disorder); Allergy; Anxiety; Asthma; Depression; Migraine; and PTSD  "(post-traumatic stress disorder).    She  has a past surgical history that includes cholecystectomy.     Social History     Social History   • Marital status: Single     Spouse name: N/A   • Number of children: N/A   • Years of education: N/A     Social History Main Topics   • Smoking status: Current Every Day Smoker     Packs/day: 0.50     Years: 20.00     Types: Cigarettes   • Smokeless tobacco: Never Used   • Alcohol use Yes      Comment: Occasionally   • Drug use: Yes      Comment: 3/30 - 1 mo sober   • Sexual activity: Yes     Partners: Male     Other Topics Concern   • Not on file     Social History Narrative   • No narrative on file       Family History   Problem Relation Age of Onset   • Diabetes Mother    • Thyroid Mother    • Diabetes Father    • Alcohol/Drug Father    • Psychiatry Sister    • Alcohol/Drug Sister    • Thyroid Brother    • Alcohol/Drug Brother          ROS  Positive for chest wall pain.  No shortness of breath, no abdominal pain, no rashes    All other systems reviewed and are negative.        Objective:     Blood pressure 110/60, pulse 72, temperature 37.5 °C (99.5 °F), resp. rate 18, height 1.575 m (5' 2\"), weight 79.4 kg (175 lb), SpO2 99 %. Body mass index is 32.01 kg/m².    Physical Exam:   Constitutional: Alert, no distress.  Eye: Equal, round and reactive, conjunctiva clear, lids normal.   ENMT: Lips without lesions, oropharynx clear.   Neck: Trachea midline, no masses, no thyromegaly. No cervical or supraclavicular lymphadenopathy  Respiratory: Unlabored respiratory effort, lungs clear to auscultation, no wheezes, no ronchi.  Cardiovascular: Normal S1, S2, no murmur, no edema. Reports pain with palpation of chest wall.   Abdomen: Soft, non-tender, no masses, no hepatosplenomegaly. Normal bowel sounds.   Skin: Warm, dry, good turgor, no rashes in visible areas.  Neuro: normal sensation in extremities.   Psych: Alert and oriented x3, normal affect and mood.        Assessment and Plan: "   The following treatment plan was discussed    1. Need for vaccination  - Pneumococal Polysaccharide Vaccine 23-Valent =>1yo SQ/IM    2. Obesity (BMI 30-39.9)  - Patient identified as having weight management issue.  Appropriate orders and counseling given.    3. Methamphetamine abuse  Discussed with patient risks and concerns with drug abuse. She reports only one use and does not intend to reuse. She is seeing a therapist. Discussed strict ER precautions and s/s to seek emergent care. Advised to complete testing as soon as possible. Most recent labs, to include CBC, were normal.   - ECHOCARDIOGRAM COMP W/O CONT; Future    4. Moderate episode of recurrent major depressive disorder (CMS-HCC)  Reports stable without medication.     5. Generalized anxiety disorder  Reports stable without medication.     6. Intercostal pain  - DX-CHEST-2 VIEWS; Future    7. Tobacco abuse  Encouraged cessation.     8. Vitamin D deficiency  - vitamin D, Ergocalciferol, (DRISDOL) 15319 units Cap capsule; Take 1 Cap by mouth every 7 days.  Dispense: 12 Cap; Refill: 0      Reviewed indication, dosage, usage and potential adverse effects of prescribed medications. Patient appears to understand, verbalizes understanding and is willing to try medications as prescribed.      Reviewed risks and benefits of treatment plan. Patient verbally agrees to plan of care.       Followup: Return in about 1 month (around 4/30/2018) for ECHO results .    ROMEO Jain.P.R.N.     PLEASE NOTE: This dictation was created using voice recognition software. I have made every reasonable attempt to correct obvious errors, but I expect that there may be errors of grammar and possibly content that I did not discover prior finalizing this note.

## 2018-03-30 NOTE — PATIENT INSTRUCTIONS
Your medical care was provided today by: LYNETTE Edmond    Thank You for the opportunity to serve you.    You may receive a brief survey in the mail shortly regarding your visit today. Please take a few moments to complete the survey and return it; no postage is necessary. We are working to serve our patient population better, improve customer service and our patients overall experience and your input can help us to accomplish this. We thank you for your help and for the opportunity to serve you today and in the future.     Special Instructions:  Always call 9-1-1 immediately if you develop a life threatening emergency.    Unless told otherwise please take all medications as directed and complete prescription therapies.     Watch for the following signs that require additional evaluation: progressive lethargy or unresponsiveness, localized pain (chest, abdomen), shortness of breath, painful breathing, progressive vomiting with weakness, bloody stools, or new rash.     If you are prescribed pain medication or any other medication that is sedating, do not take medication before or while operating a vehicle or heavy machinery or equipment due to potential side effects such as drowsiness and/or dizziness.

## 2018-03-30 NOTE — ASSESSMENT & PLAN NOTE
Patient had reported Clean and sober since 2004, however, reports relapse in late Feb 2008 due to stress. Denies any IV drug use. She is now living at home with her parents which is a safe and supportive place per her report.

## 2018-03-30 NOTE — ASSESSMENT & PLAN NOTE
This is a chronic problem. Patient reports she has a long history of depression. Has been treated in the past with several medications, Paxil seems to have worked the best, but she has d/c the medication. Denies current SI/HI. She is particiapting in counseling at her [employer]. She is also followed by [facility].

## 2018-03-30 NOTE — ASSESSMENT & PLAN NOTE
This is a new problem. She was seen and evaluated recently at urgent care. ECG normal. Chest was attributed to recent drug use and anxiety. Patient reports she continues to have intermittent pains. She sometimes has pain when she inhales deeply. Denies fever, although appears low grade today, no chills.

## 2018-04-02 ENCOUNTER — APPOINTMENT (OUTPATIENT)
Dept: RADIOLOGY | Facility: IMAGING CENTER | Age: 33
End: 2018-04-02
Attending: NURSE PRACTITIONER
Payer: MEDICAID

## 2018-04-02 ENCOUNTER — NON-PROVIDER VISIT (OUTPATIENT)
Dept: URGENT CARE | Facility: PHYSICIAN GROUP | Age: 33
End: 2018-04-02
Payer: MEDICAID

## 2018-04-02 DIAGNOSIS — R07.82 INTERCOSTAL PAIN: ICD-10-CM

## 2018-04-02 PROCEDURE — 71046 X-RAY EXAM CHEST 2 VIEWS: CPT | Performed by: FAMILY MEDICINE

## 2018-04-10 ENCOUNTER — HOSPITAL ENCOUNTER (OUTPATIENT)
Facility: MEDICAL CENTER | Age: 33
End: 2018-04-10
Attending: NURSE PRACTITIONER
Payer: MEDICAID

## 2018-04-10 ENCOUNTER — OFFICE VISIT (OUTPATIENT)
Dept: MEDICAL GROUP | Facility: CLINIC | Age: 33
End: 2018-04-10
Payer: MEDICAID

## 2018-04-10 VITALS
TEMPERATURE: 98.1 F | BODY MASS INDEX: 33.13 KG/M2 | HEART RATE: 90 BPM | SYSTOLIC BLOOD PRESSURE: 116 MMHG | HEIGHT: 62 IN | WEIGHT: 180 LBS | OXYGEN SATURATION: 100 % | RESPIRATION RATE: 18 BRPM | DIASTOLIC BLOOD PRESSURE: 62 MMHG

## 2018-04-10 DIAGNOSIS — N92.6 MISSED PERIOD: ICD-10-CM

## 2018-04-10 DIAGNOSIS — Z01.419 WELL WOMAN EXAM WITH ROUTINE GYNECOLOGICAL EXAM: ICD-10-CM

## 2018-04-10 DIAGNOSIS — N92.6 IRREGULAR PERIODS/MENSTRUAL CYCLES: ICD-10-CM

## 2018-04-10 DIAGNOSIS — N63.11 BREAST LUMP ON RIGHT SIDE AT 11 O'CLOCK POSITION: ICD-10-CM

## 2018-04-10 LAB
INT CON NEG: NEGATIVE
INT CON POS: POSITIVE
POC URINE PREGNANCY TEST: NORMAL

## 2018-04-10 PROCEDURE — 81025 URINE PREGNANCY TEST: CPT | Performed by: NURSE PRACTITIONER

## 2018-04-10 PROCEDURE — 99395 PREV VISIT EST AGE 18-39: CPT | Mod: 25 | Performed by: NURSE PRACTITIONER

## 2018-04-10 PROCEDURE — 88175 CYTOPATH C/V AUTO FLUID REDO: CPT

## 2018-04-10 PROCEDURE — 87491 CHLMYD TRACH DNA AMP PROBE: CPT

## 2018-04-10 PROCEDURE — 99000 SPECIMEN HANDLING OFFICE-LAB: CPT | Performed by: NURSE PRACTITIONER

## 2018-04-10 PROCEDURE — 87624 HPV HI-RISK TYP POOLED RSLT: CPT

## 2018-04-10 PROCEDURE — 87591 N.GONORRHOEAE DNA AMP PROB: CPT

## 2018-04-10 NOTE — PROGRESS NOTES
CC:  Pap/Well Woman Exam    HPI:     Mercy Maloney is 32 y.o. female presenting today for well woman exam with gynecological exam and Pap smear.   She reports her periods are irregular.  She is currently using  nothing for birth control per her report, she is hoping to become pregnant. She reports 1 sexual partners in the last 6 months.     No problem-specific Assessment & Plan notes found for this encounter.      She  has a past medical history of ADHD (attention deficit hyperactivity disorder); Allergy; Anxiety; Asthma; Depression; Migraine; and PTSD (post-traumatic stress disorder).     She  has a past surgical history that includes cholecystectomy.     History   Sexual Activity   • Sexual activity: Yes   • Partners: Male       Outpatient Encounter Prescriptions as of 4/10/2018   Medication Sig Dispense Refill   • vitamin D, Ergocalciferol, (DRISDOL) 18484 units Cap capsule Take 1 Cap by mouth every 7 days. 12 Cap 0     No facility-administered encounter medications on file as of 4/10/2018.        Patient Active Problem List    Diagnosis Date Noted   • Breast lump on right side at 11 o'clock position 04/10/2018   • Intercostal pain 03/30/2018   • Vitamin D deficiency 03/30/2018   • Obesity (BMI 30-39.9) 03/23/2017   • Major depressive disorder 03/23/2017   • Tobacco abuse 03/23/2017   • Migraine without aura and without status migrainosus, not intractable 03/23/2017   • Generalized anxiety disorder 03/23/2017   • Stress at home 03/23/2017   • Irregular periods/menstrual cycles 03/23/2017   • Methamphetamine abuse 03/23/2017       .  Social History     Social History   • Marital status: Single     Spouse name: N/A   • Number of children: N/A   • Years of education: N/A     Occupational History   • Not on file.     Social History Main Topics   • Smoking status: Current Every Day Smoker     Packs/day: 0.50     Years: 20.00     Types: Cigarettes   • Smokeless tobacco: Never Used   • Alcohol use Yes      Comment:  "Occasionally   • Drug use: Yes      Comment: 1/30/18 - 1 mo sober   • Sexual activity: Yes     Partners: Male     Other Topics Concern   • Not on file     Social History Narrative   • No narrative on file       Family History   Problem Relation Age of Onset   • Diabetes Mother    • Thyroid Mother    • Diabetes Father    • Alcohol/Drug Father    • Psychiatry Sister    • Alcohol/Drug Sister    • Thyroid Brother    • Alcohol/Drug Brother          ROS: Positive for irregular periods, breast lump in right breast. Denies Weight loss, fatigue, chest pain, SOB, bowel or bladder changes. No significant dysmenorrhea, concerning vaginal discharge or irritation, no dyspareunia or postcoital bleeding. Denies musculoskeletal, neurological, or psychiatric problems.    All other systems reviewed and are negative.       /62   Pulse 90   Temp 36.7 °C (98.1 °F)   Resp 18   Ht 1.575 m (5' 2\")   Wt 81.6 kg (180 lb)   LMP 02/20/2018   SpO2 100%   Breastfeeding? No   BMI 32.92 kg/m²     Physical Exam:  Yun Humphries MA present during exam.     GEN:  Appears well and in no apparent distress   NECK:  Supple without adenopathy or thyromegaly  LUNGS:  Clear and equal. No wheeze, ronchi, or rales.  CV:  RRR, S1, S2. No murmur.  Pedal pulses 2+ bilaterally.  BREAST:  Symmetrical. No nipple discharge. There is a palpable hard, mobile lump, 11oclock, right breast, non-tender, no s/s of infection, appr 3 cm.  ABD:  Soft, non-tender, non-distended, normal bowel sounds.  No hepatosplenomegaly.  :  Normal external female genitalia.  Vaginal canal clear.  Cervix appears normal without lesions or polyps, there does appear to be an IUD string?. Specimen collected from transformation zone. Bimanual exam:  No CMT, normal size uterus without masses or tenderness; no adnexal masses or tenderness.       Assessment and plan:    1. Well woman exam with routine gynecological exam  Will contact with results   - THINPREP PAP W/HPV AND CTNG; " Future    2. Missed period  negative  - POCT Pregnancy    3. Irregular periods/menstrual cycles  Based on exam, it appears patient has an IUD in place, which she reports should have been removed several years ago, per her report, last IUD placed was Mirena over [place]. Advised to complete ultrasound to confirm and may remove at next visit. Discussed s/s to seek emergent care.   - US-GYN-PELVIS TRANSVAGINAL; Future    4. Breast lump on right side at 11 o'clock position  - MA-DIAGNOSTIC MAMMO W/CAD-BILAT; Future       Reviewed risks and benefits of treatment plan. Patient verbally agrees to plan of care.       F/u pending results  Return for keep appt for May 1st .    Wesly Saenz, ROMEO.P.R.DEE.     PLEASE NOTE: This dictation was created using voice recognition software. I have made every reasonable attempt to correct obvious errors, but I expect that there may be errors of grammar and possibly content that I did not discover prior finalizing this note.

## 2018-04-11 ENCOUNTER — TELEPHONE (OUTPATIENT)
Dept: MEDICAL GROUP | Facility: CLINIC | Age: 33
End: 2018-04-11

## 2018-04-11 DIAGNOSIS — N63.11 BREAST LUMP ON RIGHT SIDE AT 11 O'CLOCK POSITION: ICD-10-CM

## 2018-04-11 NOTE — TELEPHONE ENCOUNTER
1. Caller Name: Mercy                      Call Back Number: [phone] (home)     2. Message: Patient called in to schedule mammo - needs u/s prn ordered so they can do this if needed.  She would like these orders as well as u/s transvaginal faxed over to kaleb trujillo when completed.      3. Patient approves office to leave a detailed voicemail/MyChart message: N\A

## 2018-04-12 LAB
C TRACH DNA GENITAL QL NAA+PROBE: NEGATIVE
CYTOLOGY REG CYTOL: NORMAL
HPV HR 12 DNA CVX QL NAA+PROBE: NEGATIVE
HPV16 DNA SPEC QL NAA+PROBE: NEGATIVE
HPV18 DNA SPEC QL NAA+PROBE: NEGATIVE
N GONORRHOEA DNA GENITAL QL NAA+PROBE: NEGATIVE
SPECIMEN SOURCE: NORMAL
SPECIMEN SOURCE: NORMAL

## 2018-05-01 ENCOUNTER — OFFICE VISIT (OUTPATIENT)
Dept: MEDICAL GROUP | Facility: CLINIC | Age: 33
End: 2018-05-01
Payer: MEDICAID

## 2018-05-01 VITALS
WEIGHT: 183 LBS | BODY MASS INDEX: 33.68 KG/M2 | RESPIRATION RATE: 18 BRPM | SYSTOLIC BLOOD PRESSURE: 122 MMHG | OXYGEN SATURATION: 94 % | HEART RATE: 80 BPM | DIASTOLIC BLOOD PRESSURE: 60 MMHG | TEMPERATURE: 98.8 F | HEIGHT: 62 IN

## 2018-05-01 DIAGNOSIS — N92.6 IRREGULAR PERIODS/MENSTRUAL CYCLES: ICD-10-CM

## 2018-05-01 DIAGNOSIS — Z30.432 ENCOUNTER FOR IUD REMOVAL: ICD-10-CM

## 2018-05-01 DIAGNOSIS — N63.11 BREAST LUMP ON RIGHT SIDE AT 11 O'CLOCK POSITION: ICD-10-CM

## 2018-05-01 PROBLEM — F17.210 CIGARETTE NICOTINE DEPENDENCE WITHOUT COMPLICATION: Status: ACTIVE | Noted: 2017-03-23

## 2018-05-01 PROCEDURE — 58301 REMOVE INTRAUTERINE DEVICE: CPT | Performed by: NURSE PRACTITIONER

## 2018-05-01 PROCEDURE — 99213 OFFICE O/P EST LOW 20 MIN: CPT | Mod: 25 | Performed by: NURSE PRACTITIONER

## 2018-05-01 NOTE — PROGRESS NOTES
Subjective:     Mercy Maloney is a 32 y.o. female here today for evaluation and management of the following:     Irregular periods/menstrual cycles  Patient reports she has an irregular periods and menstrual cycles for several years. She has had 3 pregnancies, one live child. Currently her partner, she is currently engaged, are actively trying to become pregnant.    Breast lump on right side at 11 o'clock position  Patient did have diagnostic mammo, which was normal and no concern.          Current medicines (including changes today)  Current Outpatient Prescriptions   Medication Sig Dispense Refill   • vitamin D, Ergocalciferol, (DRISDOL) 91026 units Cap capsule Take 1 Cap by mouth every 7 days. 12 Cap 0     No current facility-administered medications for this visit.        She  has a past medical history of ADHD (attention deficit hyperactivity disorder); Allergy; Anxiety; Asthma; Depression; Migraine; and PTSD (post-traumatic stress disorder).    She  has a past surgical history that includes cholecystectomy.     Social History     Social History   • Marital status: Single     Spouse name: N/A   • Number of children: N/A   • Years of education: N/A     Social History Main Topics   • Smoking status: Current Every Day Smoker     Packs/day: 0.00     Years: 20.00     Types: Cigarettes   • Smokeless tobacco: Never Used      Comment: 7-8 cig/day   • Alcohol use Yes      Comment: Occasionally   • Drug use: No      Comment: 1/30/18 - 1 mo sober   • Sexual activity: Yes     Partners: Male     Other Topics Concern   • Not on file     Social History Narrative   • No narrative on file       Family History   Problem Relation Age of Onset   • Diabetes Mother    • Thyroid Mother    • Diabetes Father    • Alcohol/Drug Father    • Psychiatry Sister    • Alcohol/Drug Sister    • Thyroid Brother    • Alcohol/Drug Brother          ROS  Positive for intermittent pelvic pain, irregular periods.   No fever, no chest pain, no shortness  "of breath, no abdominal pain, no rashes    All other systems reviewed and are negative.        Objective:     Blood pressure 122/60, pulse 80, temperature 37.1 °C (98.8 °F), resp. rate 18, height 1.575 m (5' 2\"), weight 83 kg (183 lb), SpO2 94 %. Body mass index is 33.47 kg/m².    Physical Exam:   Constitutional: Alert, no distress.  Eye: Equal, round and reactive, conjunctiva clear, lids normal.   Respiratory: Unlabored respiratory effort, lungs clear to auscultation, no wheezes, no ronchi.  Cardiovascular: Normal S1, S2, no murmur, no edema.   Abdomen: Soft, non-tender, no masses, no hepatosplenomegaly. Normal bowel sounds.   Skin: Warm, dry, good turgor, no rashes in visible areas.  Psych: Alert and oriented x3, normal affect and mood.    IUD Removal   -Patient desires IUD removal, I did review T/V ultrasound results and discussed with Dr. Tidwell, [facility], IUD not in myometrium.   -Discussed with patient risks of procedure. She agrees to procedure.   -IUD strings located and grasped with round forceps. IUD removed fully intact and without complication. Displayed IUD to patient who acknowledges removal. Discussed signs/symptoms to seek emergent care.   -New Birth control: she does not want any new birth control other than condoms         Assessment and Plan:   The following treatment plan was discussed    1. Encounter for IUD removal  Removed without complication. I did discuss with patient again her PAP results which showed Actinomyces. She does not appear to have any indication that she has a uterine infection. We did discuss s/s to seek emergent care. Advised will plan to repeat PAP in one year.     2. Breast lump on right side at 11 o'clock position    3. Irregular periods/menstrual cycles  Ultrasound did also show ovarian cyst. I suspect this is the cause on some of her complaints, now that IUD removed, she will likely return to more regular periods.     She did also have ECHO completed, we do " not have results despite several attempts. Once we do receive results from [facility], will contact with results.       Reviewed risks and benefits of treatment plan. Patient verbally agrees to plan of care.       Followup: Return in about 1 month (around 6/1/2018) for for other complaints .    SCHUYLER Jain.     PLEASE NOTE: This dictation was created using voice recognition software. I have made every reasonable attempt to correct obvious errors, but I expect that there may be errors of grammar and possibly content that I did not discover prior finalizing this note.

## 2018-05-07 ENCOUNTER — TELEPHONE (OUTPATIENT)
Dept: MEDICAL GROUP | Facility: CLINIC | Age: 33
End: 2018-05-07

## 2018-05-07 NOTE — TELEPHONE ENCOUNTER
Pt left voicemail stating she went to [facility] to get antibiotic she thought you were going to prescribe at her last visit. She did not say what it was for and I didn't see anything in your notes for this.

## 2018-05-07 NOTE — TELEPHONE ENCOUNTER
Please tell her that as we discussed, I confirmed with the newest guidelines we do not need to treat her unless she is having concerning symptoms (fever, pelvic pain). We can just repeat her PAP in one year. If she is having any concerning symptoms, please have her make an appt.   LYNETTE Edmond

## 2018-05-09 ENCOUNTER — OFFICE VISIT (OUTPATIENT)
Dept: URGENT CARE | Facility: PHYSICIAN GROUP | Age: 33
End: 2018-05-09
Payer: MEDICAID

## 2018-05-09 VITALS
HEART RATE: 70 BPM | OXYGEN SATURATION: 100 % | BODY MASS INDEX: 34.23 KG/M2 | RESPIRATION RATE: 16 BRPM | WEIGHT: 186 LBS | TEMPERATURE: 98.1 F | HEIGHT: 62 IN | SYSTOLIC BLOOD PRESSURE: 110 MMHG | DIASTOLIC BLOOD PRESSURE: 70 MMHG

## 2018-05-09 DIAGNOSIS — J02.9 SORE THROAT: ICD-10-CM

## 2018-05-09 DIAGNOSIS — J34.89 SINUS PRESSURE: ICD-10-CM

## 2018-05-09 LAB
INT CON NEG: NEGATIVE
INT CON POS: POSITIVE
S PYO AG THROAT QL: NEGATIVE

## 2018-05-09 PROCEDURE — 87880 STREP A ASSAY W/OPTIC: CPT | Performed by: NURSE PRACTITIONER

## 2018-05-09 PROCEDURE — 99213 OFFICE O/P EST LOW 20 MIN: CPT | Performed by: NURSE PRACTITIONER

## 2018-05-09 RX ORDER — AMOXICILLIN AND CLAVULANATE POTASSIUM 875; 125 MG/1; MG/1
1 TABLET, FILM COATED ORAL 2 TIMES DAILY
Qty: 20 TAB | Refills: 0 | Status: SHIPPED | OUTPATIENT
Start: 2018-05-09 | End: 2018-06-01

## 2018-05-09 ASSESSMENT — ENCOUNTER SYMPTOMS
SORE THROAT: 1
FEVER: 1
SINUS PAIN: 1
EYES NEGATIVE: 1
CARDIOVASCULAR NEGATIVE: 1
GASTROINTESTINAL NEGATIVE: 1
NEUROLOGICAL NEGATIVE: 1
COUGH: 1

## 2018-05-09 NOTE — PROGRESS NOTES
Subjective:      Mercy Maloney is a 32 y.o. female who presents with Sinus Problem            HPI    Patient presents complaining of sinus congestion that developed a few days ago  Patient complains of left-sided ear pain and fullness  Patient states she has nasal congestion pressure and there is yellow discharge from her nose  Patient also states she has a foul taste in her mouth   patient states she's had low-grade fevers and has been taking Motrin  Patient states she thought it was allergies but her Zyrtec is not helping this time as it normally does  Patient also complains of sore throat and the left side rates it at 45 out of 10 aches and burns  Patient states she has a mild cough no chest pain no shortness of breath    Patient is denying any nausea vomiting     lmp 2 weeks ago      Med tried: zyrtec, motrin, vitamins    + sick contacts--> kids at [facility]    PMH:  has a past medical history of ADHD (attention deficit hyperactivity disorder); Allergy; Anxiety; Asthma; Depression; Migraine; and PTSD (post-traumatic stress disorder).  MEDS:   Current Outpatient Prescriptions:   •  vitamin D, Ergocalciferol, (DRISDOL) 14277 units Cap capsule, Take 1 Cap by mouth every 7 days., Disp: 12 Cap, Rfl: 0  ALLERGIES: No Known Allergies  SURGHX:   Past Surgical History:   Procedure Laterality Date   • CHOLECYSTECTOMY       SOCHX:  reports that she has been smoking Cigarettes.  She has been smoking about 0.00 packs per day for the past 20.00 years. She has never used smokeless tobacco. She reports that she drinks alcohol. She reports that she does not use drugs.  FH: family history includes Alcohol/Drug in her brother, father, and sister; Diabetes in her father and mother; Psychiatry in her sister; Thyroid in her brother and mother.    Review of Systems   Constitutional: Positive for fever.   HENT: Positive for congestion, ear pain, sinus pain and sore throat. Negative for ear discharge.    Eyes: Negative.    Respiratory:  "Positive for cough.    Cardiovascular: Negative.    Gastrointestinal: Negative.    Genitourinary: Negative.    Skin: Negative.    Neurological: Negative.    Endo/Heme/Allergies: Positive for environmental allergies.   Psychiatric/Behavioral:        History of methamphetamine use  States she can take over-the-counter decongestants          Objective:     /70   Pulse 70   Temp 36.7 °C (98.1 °F)   Resp 16   Ht 1.575 m (5' 2\")   Wt 84.4 kg (186 lb)   SpO2 100%   BMI 34.02 kg/m²      Physical Exam   Constitutional: She is oriented to person, place, and time. She appears well-developed and well-nourished.   HENT:   Head: Normocephalic and atraumatic.   Right Ear: Tympanic membrane, external ear and ear canal normal.   Left Ear: Tympanic membrane, external ear and ear canal normal.   Nose: Mucosal edema and rhinorrhea present. Right sinus exhibits maxillary sinus tenderness and frontal sinus tenderness. Left sinus exhibits maxillary sinus tenderness and frontal sinus tenderness.   Mouth/Throat: Uvula is midline.   No teeth, patient normally wears dentures    Left tonsil little larger than right  No exudate  Mucous membranes erythematous  Nasal voice  No abscesses noted     Eyes: Conjunctivae are normal.   Neck: Normal range of motion. Neck supple.   Cardiovascular: Normal rate, regular rhythm and normal heart sounds.    Pulmonary/Chest: Effort normal and breath sounds normal.   Musculoskeletal: Normal range of motion.   Neurological: She is alert and oriented to person, place, and time.   Skin: Skin is warm and dry. Capillary refill takes less than 2 seconds.   Psychiatric: She has a normal mood and affect. Her behavior is normal. Judgment and thought content normal.               Assessment/Plan:     1. Sinus pressure     2. Sore throat  POCT Rapid Strep A     [place] strep: negative  Symptoms most consistent with a sinus infection.  Start antibiotics  Add saline rinse as well as nasal steroids such as " Flonase  Continue on zyrtec as pt also has allergies  If about not helpful patient can try decongestant for a few days  Take all medications as directed  Educated on medications  Can alternate Tylenol/ ibuprofen as needed for discomfort or fever  Handout given  Monitor follow-up for worsening or persistent symptoms

## 2018-05-09 NOTE — TELEPHONE ENCOUNTER
Patient has been informed - she is not having any symptoms, she will call and make an appt should they come up

## 2018-06-01 ENCOUNTER — APPOINTMENT (OUTPATIENT)
Dept: RADIOLOGY | Facility: IMAGING CENTER | Age: 33
End: 2018-06-01
Attending: NURSE PRACTITIONER
Payer: MEDICAID

## 2018-06-01 ENCOUNTER — OFFICE VISIT (OUTPATIENT)
Dept: MEDICAL GROUP | Facility: CLINIC | Age: 33
End: 2018-06-01
Payer: MEDICAID

## 2018-06-01 ENCOUNTER — HOSPITAL ENCOUNTER (OUTPATIENT)
Dept: LAB | Facility: MEDICAL CENTER | Age: 33
End: 2018-06-01
Attending: NURSE PRACTITIONER
Payer: MEDICAID

## 2018-06-01 VITALS
HEIGHT: 62 IN | SYSTOLIC BLOOD PRESSURE: 126 MMHG | HEART RATE: 80 BPM | BODY MASS INDEX: 34.41 KG/M2 | OXYGEN SATURATION: 98 % | RESPIRATION RATE: 16 BRPM | WEIGHT: 187 LBS | TEMPERATURE: 98.6 F | DIASTOLIC BLOOD PRESSURE: 70 MMHG

## 2018-06-01 DIAGNOSIS — E55.9 VITAMIN D DEFICIENCY: ICD-10-CM

## 2018-06-01 DIAGNOSIS — G89.29 CHRONIC RIGHT SHOULDER PAIN: ICD-10-CM

## 2018-06-01 DIAGNOSIS — J30.1 SEASONAL ALLERGIC RHINITIS DUE TO POLLEN: ICD-10-CM

## 2018-06-01 DIAGNOSIS — M25.511 CHRONIC RIGHT SHOULDER PAIN: ICD-10-CM

## 2018-06-01 LAB — 25(OH)D3 SERPL-MCNC: 48 NG/ML (ref 30–100)

## 2018-06-01 PROCEDURE — 36415 COLL VENOUS BLD VENIPUNCTURE: CPT

## 2018-06-01 PROCEDURE — 99214 OFFICE O/P EST MOD 30 MIN: CPT | Performed by: NURSE PRACTITIONER

## 2018-06-01 PROCEDURE — 82306 VITAMIN D 25 HYDROXY: CPT

## 2018-06-01 PROCEDURE — 73030 X-RAY EXAM OF SHOULDER: CPT | Mod: RT | Performed by: FAMILY MEDICINE

## 2018-06-01 RX ORDER — FLUTICASONE PROPIONATE 50 MCG
1 SPRAY, SUSPENSION (ML) NASAL DAILY
Qty: 16 G | Refills: 1 | Status: SHIPPED | OUTPATIENT
Start: 2018-06-01 | End: 2020-03-05

## 2018-06-01 RX ORDER — CYCLOBENZAPRINE HCL 5 MG
5-10 TABLET ORAL 3 TIMES DAILY PRN
Qty: 30 TAB | Refills: 0 | Status: SHIPPED | OUTPATIENT
Start: 2018-06-01 | End: 2020-03-05

## 2018-06-01 RX ORDER — LORATADINE 10 MG/1
10 TABLET ORAL DAILY
COMMUNITY
End: 2020-03-05

## 2018-06-01 RX ORDER — NAPROXEN 500 MG/1
500 TABLET ORAL 2 TIMES DAILY WITH MEALS
Qty: 28 TAB | Refills: 0 | Status: SHIPPED | OUTPATIENT
Start: 2018-06-01 | End: 2020-03-05

## 2018-06-01 NOTE — PATIENT INSTRUCTIONS
Xray of shoulder   Physical Therapy     Your medical care was provided today by: LYNETTE Edmond    Thank You for the opportunity to serve you.    You may receive a brief survey in the mail shortly regarding your visit today. Please take a few moments to complete the survey and return it; no postage is necessary. We are working to serve our patient population better, improve customer service and our patients overall experience and your input can help us to accomplish this. We thank you for your help and for the opportunity to serve you today and in the future.     Special Instructions:  Always call 9-1-1 immediately if you develop a life threatening emergency.    Unless told otherwise please take all medications as directed and complete prescription therapies.     Watch for the following signs that require additional evaluation: progressive lethargy or unresponsiveness, localized pain (chest, abdomen), shortness of breath, painful breathing, progressive vomiting with weakness, bloody stools, or new rash.     If you are prescribed pain medication or any other medication that is sedating, do not take medication before or while operating a vehicle or heavy machinery or equipment due to potential side effects such as drowsiness and/or dizziness.
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
## 2018-06-01 NOTE — ASSESSMENT & PLAN NOTE
This is a chronic problem, she has taken Vit D 64216 units weekly since March, she is due for repeat labs.

## 2018-06-01 NOTE — ASSESSMENT & PLAN NOTE
This is a chronic problem which has not been well controlled, especially since her sinus infection in May. She does feel a lot better but reports her cough has lingered a bit. No new fever, pain.
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
## 2018-11-16 ENCOUNTER — OFFICE VISIT (OUTPATIENT)
Dept: URGENT CARE | Facility: PHYSICIAN GROUP | Age: 33
End: 2018-11-16
Payer: MEDICAID

## 2018-11-16 VITALS
BODY MASS INDEX: 39.38 KG/M2 | HEIGHT: 62 IN | DIASTOLIC BLOOD PRESSURE: 62 MMHG | RESPIRATION RATE: 20 BRPM | WEIGHT: 214 LBS | OXYGEN SATURATION: 99 % | HEART RATE: 67 BPM | SYSTOLIC BLOOD PRESSURE: 120 MMHG | TEMPERATURE: 98.7 F

## 2018-11-16 DIAGNOSIS — J01.01 ACUTE RECURRENT MAXILLARY SINUSITIS: ICD-10-CM

## 2018-11-16 PROCEDURE — 99214 OFFICE O/P EST MOD 30 MIN: CPT | Performed by: NURSE PRACTITIONER

## 2018-11-16 RX ORDER — AMOXICILLIN AND CLAVULANATE POTASSIUM 875; 125 MG/1; MG/1
1 TABLET, FILM COATED ORAL 2 TIMES DAILY
Qty: 14 TAB | Refills: 0 | Status: SHIPPED | OUTPATIENT
Start: 2018-11-16 | End: 2018-11-23

## 2018-11-16 ASSESSMENT — ENCOUNTER SYMPTOMS
SHORTNESS OF BREATH: 0
COUGH: 1
PSYCHIATRIC NEGATIVE: 1
SINUS PAIN: 1
BACK PAIN: 0
BLURRED VISION: 0
NECK PAIN: 0
HEADACHES: 0
CONSTIPATION: 0
DOUBLE VISION: 0
WHEEZING: 0
PHOTOPHOBIA: 0
ABDOMINAL PAIN: 0
SENSORY CHANGE: 0
CHILLS: 0
NAUSEA: 0
FEVER: 1
SORE THROAT: 0
MUSCULOSKELETAL NEGATIVE: 1
DIARRHEA: 0
WEAKNESS: 0
VOMITING: 0
SPEECH CHANGE: 0

## 2018-11-17 NOTE — PROGRESS NOTES
Subjective:   Mercy Maloney is a 33 y.o. female who presents for Sinus Problem (sinus infection, ear infection)        HPI   Patient presents with new onset cough, sinus pressure, and ear pain that started a week ago. Approximately 2 weeks ago, she felt like she was having allergies with runny nose and cough, but states her symptoms have progressed.  Rates her symptoms as moderate in severity, with sinus pressure being the worst. Has tried OTC Flonase and Advil with some relief. Denies alleviating or aggravating factors.    Patient with history of allergies and recurrent sinus infections.    Review of Systems   Constitutional: Positive for fever. Negative for chills.   HENT: Positive for congestion, ear pain and sinus pain. Negative for ear discharge and sore throat.    Eyes: Negative for blurred vision, double vision and photophobia.   Respiratory: Positive for cough. Negative for shortness of breath and wheezing.    Cardiovascular: Negative for chest pain.   Gastrointestinal: Negative for abdominal pain, constipation, diarrhea, nausea and vomiting.   Genitourinary: Negative.    Musculoskeletal: Negative.  Negative for back pain and neck pain.   Skin: Negative.    Neurological: Negative for sensory change, speech change, weakness and headaches.   Psychiatric/Behavioral: Negative.      No Known Allergies   PMH:  has a past medical history of ADHD (attention deficit hyperactivity disorder); Allergy; Anxiety; Asthma; Depression; Migraine; and PTSD (post-traumatic stress disorder).  MEDS:   Current Outpatient Prescriptions:   •  amoxicillin-clavulanate (AUGMENTIN) 875-125 MG Tab, Take 1 Tab by mouth 2 times a day for 7 days., Disp: 14 Tab, Rfl: 0  •  loratadine (CLARITIN) 10 MG Tab, Take 10 mg by mouth every day., Disp: , Rfl:   •  fluticasone (FLONASE) 50 MCG/ACT nasal spray, Spray 1 Spray in nose every day., Disp: 16 g, Rfl: 1  •  naproxen (NAPROSYN) 500 MG Tab, Take 1 Tab by mouth 2 times a day, with meals., Disp: 28  "Tab, Rfl: 0  •  cyclobenzaprine (FLEXERIL) 5 MG tablet, Take 1-2 Tabs by mouth 3 times a day as needed., Disp: 30 Tab, Rfl: 0  ALLERGIES: No Known Allergies  SURGHX:   Past Surgical History:   Procedure Laterality Date   • CHOLECYSTECTOMY       SOCHX:  reports that she has been smoking Cigarettes.  She has been smoking about 0.00 packs per day for the past 20.00 years. She has never used smokeless tobacco. She reports that she drinks alcohol. She reports that she does not use drugs.  FH: Family history was reviewed, no pertinent findings to report     Objective:   /62   Pulse 67   Temp 37.1 °C (98.7 °F)   Resp 20   Ht 1.575 m (5' 2\")   Wt 97.1 kg (214 lb)   SpO2 99%   BMI 39.14 kg/m²   Physical Exam   Constitutional: She is oriented to person, place, and time. She appears well-developed and well-nourished.   HENT:   Head: Normocephalic.   Right Ear: Hearing and ear canal normal. Tympanic membrane is not erythematous. A middle ear effusion is present.   Left Ear: Hearing and ear canal normal. Tympanic membrane is not erythematous. A middle ear effusion is present.   Nose: Rhinorrhea present. Right sinus exhibits maxillary sinus tenderness. Right sinus exhibits no frontal sinus tenderness. Left sinus exhibits maxillary sinus tenderness. Left sinus exhibits no frontal sinus tenderness.   Mouth/Throat: Uvula is midline, oropharynx is clear and moist and mucous membranes are normal. No posterior oropharyngeal erythema. No tonsillar exudate.   Eyes: Pupils are equal, round, and reactive to light. Conjunctivae, EOM and lids are normal.   Neck: Normal range of motion. No thyromegaly present.   Cardiovascular: Normal rate, regular rhythm and normal heart sounds.    Pulmonary/Chest: Effort normal and breath sounds normal. No respiratory distress. She has no wheezes.   Abdominal: Soft. Bowel sounds are normal. There is no hepatosplenomegaly.   Lymphadenopathy:        Head (right side): Submandibular adenopathy " present. No tonsillar adenopathy present.        Head (left side): No submandibular and no tonsillar adenopathy present.     She has no cervical adenopathy.   Neurological: She is alert and oriented to person, place, and time.   Skin: Skin is warm and dry.   Psychiatric: She has a normal mood and affect. Her behavior is normal. Judgment and thought content normal.   Vitals reviewed.        Assessment/Plan:   Assessment    1. Acute recurrent maxillary sinusitis  - REFERRAL TO ENT  - amoxicillin-clavulanate (AUGMENTIN) 875-125 MG Tab; Take 1 Tab by mouth 2 times a day for 7 days.  Dispense: 14 Tab; Refill: 0    Use over-the-counter Flonase daily, one spray each nostril in the morning.    Patient encouraged to increase clear liquid intake    Discussed tobacco use during visit and how can worsen symptoms    Differential diagnosis, natural history, supportive care, and indications for immediate follow-up discussed.

## 2019-08-06 NOTE — PATIENT INSTRUCTIONS
Spoke to the pt, his machine is broken.   Per Wilfredo Dawson, pt just needs to contact them and they can fix the machine.      Your medical care was provided today by: LYNETTE Edmond    Thank You for the opportunity to serve you.    You may receive a brief survey in the mail shortly regarding your visit today. Please take a few moments to complete the survey and return it; no postage is necessary. We are working to serve our patient population better, improve customer service and our patients overall experience and your input can help us to accomplish this. We thank you for your help and for the opportunity to serve you today and in the future.     Special Instructions:  Always call 9-1-1 immediately if you develop a life threatening emergency.    Unless told otherwise please take all medications as directed and complete prescription therapies.     Watch for the following signs that require additional evaluation: progressive lethargy or unresponsiveness, localized pain (chest, abdomen), shortness of breath, painful breathing, progressive vomiting with weakness, bloody stools, or new rash.     If you are prescribed pain medication or any other medication that is sedating, do not take medication before or while operating a vehicle or heavy machinery or equipment due to potential side effects such as drowsiness and/or dizziness.

## 2020-03-05 ENCOUNTER — OFFICE VISIT (OUTPATIENT)
Dept: MEDICAL GROUP | Facility: PHYSICIAN GROUP | Age: 35
End: 2020-03-05
Payer: COMMERCIAL

## 2020-03-05 VITALS
OXYGEN SATURATION: 99 % | SYSTOLIC BLOOD PRESSURE: 126 MMHG | DIASTOLIC BLOOD PRESSURE: 76 MMHG | WEIGHT: 196 LBS | TEMPERATURE: 99 F | RESPIRATION RATE: 16 BRPM | HEIGHT: 62 IN | HEART RATE: 60 BPM | BODY MASS INDEX: 36.07 KG/M2

## 2020-03-05 DIAGNOSIS — D50.9 IRON DEFICIENCY ANEMIA, UNSPECIFIED IRON DEFICIENCY ANEMIA TYPE: ICD-10-CM

## 2020-03-05 DIAGNOSIS — Z00.00 ANNUAL PHYSICAL EXAM: ICD-10-CM

## 2020-03-05 DIAGNOSIS — E55.9 VITAMIN D DEFICIENCY: ICD-10-CM

## 2020-03-05 DIAGNOSIS — F33.1 MODERATE EPISODE OF RECURRENT MAJOR DEPRESSIVE DISORDER (HCC): ICD-10-CM

## 2020-03-05 DIAGNOSIS — F19.10 DRUG ABUSE (HCC): ICD-10-CM

## 2020-03-05 PROBLEM — F43.9 STRESS AT HOME: Status: RESOLVED | Noted: 2017-03-23 | Resolved: 2020-03-05

## 2020-03-05 PROBLEM — R07.82 INTERCOSTAL PAIN: Status: RESOLVED | Noted: 2018-03-30 | Resolved: 2020-03-05

## 2020-03-05 PROBLEM — N63.11 BREAST LUMP ON RIGHT SIDE AT 11 O'CLOCK POSITION: Status: RESOLVED | Noted: 2018-04-10 | Resolved: 2020-03-05

## 2020-03-05 PROCEDURE — 99215 OFFICE O/P EST HI 40 MIN: CPT | Performed by: NURSE PRACTITIONER

## 2020-03-05 RX ORDER — HYDROXYZINE PAMOATE 25 MG/1
25 CAPSULE ORAL 3 TIMES DAILY PRN
Qty: 90 CAP | Refills: 0 | Status: SHIPPED | OUTPATIENT
Start: 2020-03-05 | End: 2020-03-12 | Stop reason: SDUPTHER

## 2020-03-05 RX ORDER — PAROXETINE HYDROCHLORIDE 20 MG/1
20 TABLET, FILM COATED ORAL DAILY
Qty: 90 TAB | Refills: 0 | Status: SHIPPED | OUTPATIENT
Start: 2020-03-05 | End: 2020-03-12 | Stop reason: SDUPTHER

## 2020-03-05 SDOH — HEALTH STABILITY: MENTAL HEALTH: HOW OFTEN DO YOU HAVE A DRINK CONTAINING ALCOHOL?: MONTHLY OR LESS

## 2020-03-05 ASSESSMENT — ENCOUNTER SYMPTOMS
PALPITATIONS: 0
DEPRESSION: 1
FEVER: 0
SHORTNESS OF BREATH: 0
CHILLS: 0
NERVOUS/ANXIOUS: 1
BLURRED VISION: 0
INSOMNIA: 1

## 2020-03-05 ASSESSMENT — PATIENT HEALTH QUESTIONNAIRE - PHQ9
SUM OF ALL RESPONSES TO PHQ9 QUESTIONS 1 AND 2: 4
6. FEELING BAD ABOUT YOURSELF - OR THAT YOU ARE A FAILURE OR HAVE LET YOURSELF OR YOUR FAMILY DOWN: MORE THAN HALF THE DAYS
5. POOR APPETITE OR OVEREATING: 2 - MORE THAN HALF THE DAYS
5. POOR APPETITE OR OVEREATING: MORE THAN HALF THE DAYS
7. TROUBLE CONCENTRATING ON THINGS, SUCH AS READING THE NEWSPAPER OR WATCHING TELEVISION: NEARLY EVERY DAY
4. FEELING TIRED OR HAVING LITTLE ENERGY: MORE THAN HALF THE DAYS
CLINICAL INTERPRETATION OF PHQ2 SCORE: 4
8. MOVING OR SPEAKING SO SLOWLY THAT OTHER PEOPLE COULD HAVE NOTICED. OR THE OPPOSITE, BEING SO FIGETY OR RESTLESS THAT YOU HAVE BEEN MOVING AROUND A LOT MORE THAN USUAL: NEARLY EVERY DAY
3. TROUBLE FALLING OR STAYING ASLEEP OR SLEEPING TOO MUCH: NEARLY EVERY DAY
2. FEELING DOWN, DEPRESSED, IRRITABLE, OR HOPELESS: MORE THAN HALF THE DAYS
SUM OF ALL RESPONSES TO PHQ QUESTIONS 1-9: 16
1. LITTLE INTEREST OR PLEASURE IN DOING THINGS: MORE THAN HALF THE DAYS

## 2020-03-05 ASSESSMENT — FIBROSIS 4 INDEX: FIB4 SCORE: 0.46

## 2020-03-05 ASSESSMENT — LIFESTYLE VARIABLES: SUBSTANCE_ABUSE: 1

## 2020-03-05 NOTE — ASSESSMENT & PLAN NOTE
This is a chronic issue. She is experiencing fatigue. She is not currently taking iron supplement, however has been trying to consume foods high in iron on a regular basis.

## 2020-03-05 NOTE — PROGRESS NOTES
"Mercy Maloney is a 34 y.o. female here to establish care. Her previous PCP was Wesly OJEDA. She presents with the following concerns:    HPI:      Major depressive disorder  This is a chronic issue. She reports worsening of symptoms over the past year which was provoked by the death of her father. She has been experiencing depression, fatigue, and anxiety. She denies suicidal or homicidal ideation. 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 States that she thought she was taking meth, however was informed that she was given \"several unknown drugs that caused her to black out.\" She is an active member of her [employer] and she has been going to counseling at her [employer] which she finds helpful. She was previously treated with Paxil and hydroxyzine in the past which she found helpful.    Iron deficiency anemia  This is a chronic issue. She is experiencing fatigue. She is not currently taking iron supplement, however has been trying to consume foods high in iron on a regular basis.    Vitamin D deficiency  This is a chronic issue. She is experiencing fatigue, anxiety, and depression. She is not currently taking supplementation. Her last Vit D level was 48 in June 2018.      Current medicines (including changes today)  Current Outpatient Medications   Medication Sig Dispense Refill   • hydrOXYzine pamoate (VISTARIL) 25 MG Cap Take 1 Cap by mouth 3 times a day as needed for Itching or Anxiety. 90 Cap 0   • PARoxetine (PAXIL) 20 MG Tab Take 1 Tab by mouth every day. 90 Tab 0     No current facility-administered medications for this visit.      She  has a past medical history of ADHD (attention deficit hyperactivity disorder), Allergy, Anemia, Anxiety, Asthma, Depression, Iron deficiency anemia, Migraine, and PTSD (post-traumatic stress disorder).  She  has a past surgical history that includes cholecystectomy and primary c section.  Social History     Tobacco Use   • Smoking status: Current " Every Day Smoker     Packs/day: 0.00     Years: 25.00     Pack years: 0.00     Types: Cigarettes   • Smokeless tobacco: Never Used   • Tobacco comment: 5/7 cigs a day   Substance Use Topics   • Alcohol use: Yes     Frequency: Monthly or less   • Drug use: Yes     Types: Marijuana     Comment: rare marijuana usage; Hx of Meth 7 years     Social History     Social History Narrative   • Not on file     Family History   Problem Relation Age of Onset   • Diabetes Mother    • Thyroid Mother    • Diabetes Father    • Alcohol/Drug Father    • Other Father         Cirrhosis; hepatitis   • Cancer Father         Liver   • Psychiatric Illness Sister    • Alcohol/Drug Sister    • Other Sister         MS?   • ADD / ADHD Brother    • No Known Problems Son      Family Status   Relation Name Status   • Mo  Alive   • Fa     • Sis  Alive   • Bro  Alive   • Son  Alive, age 15y       There are no preventive care reminders to display for this patient.    Review of Systems   Constitutional: Positive for malaise/fatigue. Negative for chills and fever.   HENT: Negative for hearing loss.    Eyes: Negative for blurred vision.   Respiratory: Negative for shortness of breath.    Cardiovascular: Negative for chest pain and palpitations.   Psychiatric/Behavioral: Positive for depression and substance abuse. Negative for suicidal ideas. The patient is nervous/anxious and has insomnia.        All other systems reviewed and are negative    Depression Screening    Little interest or pleasure in doing things?  2 - more than half the daysMore than half the days  Feeling down, depressed , or hopeless? 2 - more than half the daysMore than half the days  Trouble falling or staying asleep, or sleeping too much?  2 - more than half the daysNearly every day  Feeling tired or having little energy?  2 - more than half the daysMore than half the days  Poor appetite or overeating?  2 - more than half the daysMore than half the days  Feeling bad about  "yourself - or that you are a failure or have let yourself or your family down? 2 - more than half the daysMore than half the days  Trouble concentrating on things, such as reading the newspaper or watching television? 2 - more than half the daysNearly every day  Moving or speaking so slowly that other people could have noticed.  Or the opposite - being so fidgety or restless that you have been moving around a lot more than usual?  2 - more than half the daysNearly every day  Thoughts that you would be better off dead, or of hurting yourself?  0 - not at all   Patient Health Questionnaire Score: 16       If depressive symptoms identified deferred to follow up visit unless specifically addressed in assesment and plan.    Interpretation of PHQ-9 Total Score   Score Severity   1-4 No Depression   5-9 Mild Depression   10-14 Moderate Depression   15-19 Moderately Severe Depression   20-27 Severe Depression       Objective:     /76 (BP Location: Right arm, Patient Position: Sitting)   Pulse 60   Temp 37.2 °C (99 °F) (Tympanic)   Resp 16   Ht 1.575 m (5' 2\")   Wt 88.9 kg (196 lb)   SpO2 99%  Body mass index is 35.85 kg/m².    Physical Exam:  Constitutional: Oriented to person, place, and time and well-developed, well-nourished, and in no distress.   HENT:   Head: Normocephalic and atraumatic.   Eyes: Conjunctivae and EOM are normal. Pupils are equal, round, and reactive to light.   Neck: Normal range of motion. Neck supple.   Cardiovascular: Normal rate, regular rhythm, normal heart sounds. Exam reveals no friction rub. No murmur heard.  Pulmonary/Chest: Effort normal and breath sounds normal. No respiratory distress or use of accessory muscles. No wheezes, rhonchi, or rales.   Musculoskeletal: Full range of motion. No deformity or swelling of joints. DTRs intact.   Neurological: Alert and oriented to person, place, and time. Gait normal.   Skin: Skin is warm and dry. No cyanosis. No edema.  Psychiatric: Mood, " memory, affect and judgment normal.     Assessment and Plan:   The following treatment plan was discussed    1. Moderate episode of recurrent major depressive disorder (HCC)  Uncontrolled. Initiate treatment with Paxil and hydroxyzine. Labs ordered to r/o organic cause. Encouraged to continue counseling services through her [facility]. Advised to notify clinic if symptoms worsen. Patient verbalized understanding and agreed to plan of care.  - TSH; Future  - FREE THYROXINE; Future  - VITAMIN D,25 HYDROXY; Future  - Patient has been identified as having a positive depression screening. Appropriate orders and counseling have been given.  - hydrOXYzine pamoate (VISTARIL) 25 MG Cap; Take 1 Cap by mouth 3 times a day as needed for Itching or Anxiety.  Dispense: 90 Cap; Refill: 0  - PARoxetine (PAXIL) 20 MG Tab; Take 1 Tab by mouth every day.  Dispense: 90 Tab; Refill: 0    2. Drug abuse (HCC)  She is requesting drug toxicology screening.  - PAIN MANAGEMENT SCRN, W/ RFLX TO QNT; Future    3. Iron deficiency anemia, unspecified iron deficiency anemia type  Labs ordered to evaluate iron levels.  - FERRITIN; Future  - IRON/TOTAL IRON BIND; Future    4. Vitamin D deficiency  Labs ordered to assess Vit D.     5. Annual physical exam  Routine screening labs ordered.  - CBC WITH DIFFERENTIAL; Future  - Comp Metabolic Panel; Future  - HEMOGLOBIN A1C; Future  - TSH; Future  - FREE THYROXINE; Future  - VITAMIN D,25 HYDROXY; Future  - Lipid Profile; Future    Records requested.    Total of 40 minutes spent face-to-face time spent with patient, >50% of the total time discussing patient's issues and symptoms as listed above in assessment and plan, as well as managing coordination of care for future evaluation and treatment.    Followup: Return in about 2 weeks (around 3/19/2020), or if symptoms worsen or fail to improve, for For follow-up on, Depression/Anxiety.

## 2020-03-05 NOTE — ASSESSMENT & PLAN NOTE
This is a chronic issue. She is experiencing fatigue, anxiety, and depression. She is not currently taking supplementation. Her last Vit D level was 48 in June 2018.

## 2020-03-05 NOTE — ASSESSMENT & PLAN NOTE
"This is a chronic issue. She reports worsening of symptoms over the past year which was provoked by the death of her father. She has been experiencing depression, fatigue, and anxiety. She denies suicidal or homicidal ideation. 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 States that she thought she was taking meth, however was informed that she was given \"several unknown drugs that caused her to black out.\" She is an active member of her [employer] and she has been going to counseling at her [employer] which she finds helpful. She was previously treated with Paxil and hydroxyzine in the past which she found helpful.  "

## 2020-03-10 ENCOUNTER — HOSPITAL ENCOUNTER (OUTPATIENT)
Dept: LAB | Facility: MEDICAL CENTER | Age: 35
End: 2020-03-10
Attending: NURSE PRACTITIONER
Payer: COMMERCIAL

## 2020-03-10 DIAGNOSIS — Z00.00 ANNUAL PHYSICAL EXAM: ICD-10-CM

## 2020-03-10 DIAGNOSIS — F19.10 DRUG ABUSE (HCC): ICD-10-CM

## 2020-03-10 DIAGNOSIS — F33.1 MODERATE EPISODE OF RECURRENT MAJOR DEPRESSIVE DISORDER (HCC): ICD-10-CM

## 2020-03-10 DIAGNOSIS — E55.9 VITAMIN D DEFICIENCY: ICD-10-CM

## 2020-03-10 DIAGNOSIS — D50.9 IRON DEFICIENCY ANEMIA, UNSPECIFIED IRON DEFICIENCY ANEMIA TYPE: ICD-10-CM

## 2020-03-10 LAB
25(OH)D3 SERPL-MCNC: 25 NG/ML (ref 30–100)
ALBUMIN SERPL BCP-MCNC: 4.1 G/DL (ref 3.2–4.9)
ALBUMIN/GLOB SERPL: 1.4 G/DL
ALP SERPL-CCNC: 60 U/L (ref 30–99)
ALT SERPL-CCNC: 20 U/L (ref 2–50)
ANION GAP SERPL CALC-SCNC: 9 MMOL/L (ref 0–11.9)
AST SERPL-CCNC: 14 U/L (ref 12–45)
BASOPHILS # BLD AUTO: 0.7 % (ref 0–1.8)
BASOPHILS # BLD: 0.05 K/UL (ref 0–0.12)
BILIRUB SERPL-MCNC: 0.4 MG/DL (ref 0.1–1.5)
BUN SERPL-MCNC: 12 MG/DL (ref 8–22)
CALCIUM SERPL-MCNC: 9.8 MG/DL (ref 8.5–10.5)
CHLORIDE SERPL-SCNC: 106 MMOL/L (ref 96–112)
CHOLEST SERPL-MCNC: 124 MG/DL (ref 100–199)
CO2 SERPL-SCNC: 26 MMOL/L (ref 20–33)
CREAT SERPL-MCNC: 0.86 MG/DL (ref 0.5–1.4)
EOSINOPHIL # BLD AUTO: 0.15 K/UL (ref 0–0.51)
EOSINOPHIL NFR BLD: 2 % (ref 0–6.9)
ERYTHROCYTE [DISTWIDTH] IN BLOOD BY AUTOMATED COUNT: 44.1 FL (ref 35.9–50)
EST. AVERAGE GLUCOSE BLD GHB EST-MCNC: 108 MG/DL
FASTING STATUS PATIENT QL REPORTED: NORMAL
FERRITIN SERPL-MCNC: 72 NG/ML (ref 10–291)
GLOBULIN SER CALC-MCNC: 3 G/DL (ref 1.9–3.5)
GLUCOSE SERPL-MCNC: 93 MG/DL (ref 65–99)
HBA1C MFR BLD: 5.4 % (ref 0–5.6)
HCT VFR BLD AUTO: 45.1 % (ref 37–47)
HDLC SERPL-MCNC: 41 MG/DL
HGB BLD-MCNC: 14.6 G/DL (ref 12–16)
IMM GRANULOCYTES # BLD AUTO: 0.01 K/UL (ref 0–0.11)
IMM GRANULOCYTES NFR BLD AUTO: 0.1 % (ref 0–0.9)
IRON SATN MFR SERPL: 21 % (ref 15–55)
IRON SERPL-MCNC: 72 UG/DL (ref 40–170)
LDLC SERPL CALC-MCNC: 66 MG/DL
LYMPHOCYTES # BLD AUTO: 1.93 K/UL (ref 1–4.8)
LYMPHOCYTES NFR BLD: 26.4 % (ref 22–41)
MCH RBC QN AUTO: 29.9 PG (ref 27–33)
MCHC RBC AUTO-ENTMCNC: 32.4 G/DL (ref 33.6–35)
MCV RBC AUTO: 92.4 FL (ref 81.4–97.8)
MONOCYTES # BLD AUTO: 0.65 K/UL (ref 0–0.85)
MONOCYTES NFR BLD AUTO: 8.9 % (ref 0–13.4)
NEUTROPHILS # BLD AUTO: 4.53 K/UL (ref 2–7.15)
NEUTROPHILS NFR BLD: 61.9 % (ref 44–72)
NRBC # BLD AUTO: 0 K/UL
NRBC BLD-RTO: 0 /100 WBC
PLATELET # BLD AUTO: 256 K/UL (ref 164–446)
PMV BLD AUTO: 11.2 FL (ref 9–12.9)
POTASSIUM SERPL-SCNC: 4.4 MMOL/L (ref 3.6–5.5)
PROT SERPL-MCNC: 7.1 G/DL (ref 6–8.2)
RBC # BLD AUTO: 4.88 M/UL (ref 4.2–5.4)
SODIUM SERPL-SCNC: 141 MMOL/L (ref 135–145)
T4 FREE SERPL-MCNC: 0.92 NG/DL (ref 0.53–1.43)
TIBC SERPL-MCNC: 349 UG/DL (ref 250–450)
TRIGL SERPL-MCNC: 85 MG/DL (ref 0–149)
TSH SERPL DL<=0.005 MIU/L-ACNC: 1.4 UIU/ML (ref 0.38–5.33)
WBC # BLD AUTO: 7.3 K/UL (ref 4.8–10.8)

## 2020-03-10 PROCEDURE — 84439 ASSAY OF FREE THYROXINE: CPT

## 2020-03-10 PROCEDURE — 82306 VITAMIN D 25 HYDROXY: CPT

## 2020-03-10 PROCEDURE — 80053 COMPREHEN METABOLIC PANEL: CPT

## 2020-03-10 PROCEDURE — 80061 LIPID PANEL: CPT

## 2020-03-10 PROCEDURE — G0480 DRUG TEST DEF 1-7 CLASSES: HCPCS

## 2020-03-10 PROCEDURE — 36415 COLL VENOUS BLD VENIPUNCTURE: CPT

## 2020-03-10 PROCEDURE — 83540 ASSAY OF IRON: CPT

## 2020-03-10 PROCEDURE — 85025 COMPLETE CBC W/AUTO DIFF WBC: CPT

## 2020-03-10 PROCEDURE — 83036 HEMOGLOBIN GLYCOSYLATED A1C: CPT

## 2020-03-10 PROCEDURE — 82728 ASSAY OF FERRITIN: CPT

## 2020-03-10 PROCEDURE — 83550 IRON BINDING TEST: CPT

## 2020-03-10 PROCEDURE — 84443 ASSAY THYROID STIM HORMONE: CPT

## 2020-03-10 PROCEDURE — 80307 DRUG TEST PRSMV CHEM ANLYZR: CPT

## 2020-03-12 ENCOUNTER — TELEPHONE (OUTPATIENT)
Dept: MEDICAL GROUP | Facility: PHYSICIAN GROUP | Age: 35
End: 2020-03-12

## 2020-03-12 LAB
AMPHET CTO UR CFM-MCNC: POSITIVE NG/ML
BARBITURATES CTO UR CFM-MCNC: NEGATIVE NG/ML
BENZODIAZ CTO UR CFM-MCNC: NEGATIVE NG/ML
BUPRENORPHINE UR-MCNC: NEGATIVE NG/ML
CANNABINOIDS CTO UR CFM-MCNC: NEGATIVE NG/ML
CARISOPRODOL UR-MCNC: NEGATIVE NG/ML
COCAINE CTO UR CFM-MCNC: NEGATIVE NG/ML
DRUG SCREEN COMMENT UR-IMP: NORMAL
ETHYL GLUCURONIDE UR QL SCN: NEGATIVE NG/ML
FENTANYL UR-MCNC: NEGATIVE NG/ML
MEPERIDINE CTO UR SCN-MCNC: NEGATIVE NG/ML
METHADONE CTO UR CFM-MCNC: NEGATIVE NG/ML
OPIATES UR QL SCN: NEGATIVE NG/ML
OXYCDOXYM URSCRN 2005102: NEGATIVE NG/ML
PCP CTO UR CFM-MCNC: NEGATIVE NG/ML
PROPOXYPH CTO UR CFM-MCNC: NEGATIVE NG/ML
TAPENTADOL UR-MCNC: NEGATIVE NG/ML
TRAMADOL CTO UR SCN-MCNC: NEGATIVE NG/ML
ZOLPIDEM UR-MCNC: NEGATIVE NG/ML

## 2020-03-12 NOTE — TELEPHONE ENCOUNTER
Pt called.  Renetta wrote a prescription for her but  It was sent to [facility] instead of [facility].  Because it is a new prescription, [facility] will not transfer it and insurance will not cover it if it doesn't come from [facility].  Please send prescription to [facility] in [place].

## 2020-03-15 LAB
AMPHET UR CFM-MCNC: 1730 NG/ML
MDA UR CFM-MCNC: <200 NG/ML
MDEA UR CFM-MCNC: <200 NG/ML
MDMA UR CFM-MCNC: <200 NG/ML
METHAMPHET UR CFM-MCNC: 1408 NG/ML
PHENTERMINE UR CFM-MCNC: <200 NG/ML

## 2020-03-17 ENCOUNTER — TELEPHONE (OUTPATIENT)
Dept: MEDICAL GROUP | Facility: PHYSICIAN GROUP | Age: 35
End: 2020-03-17

## 2020-03-17 NOTE — TELEPHONE ENCOUNTER
Phone Number Called: [phone] (home)       Call outcome: Left detailed message for patient. Informed to call back with any additional questions.    Message: Left message for pt that her lab results will be discussed at next appt    ----- Message from PATIENCE Weiss sent at 3/16/2020 10:03 AM PDT -----  I will discuss results at upcoming appointment.    PATIENCE Weiss

## 2022-12-02 ENCOUNTER — OFFICE VISIT (OUTPATIENT)
Dept: URGENT CARE | Facility: PHYSICIAN GROUP | Age: 37
End: 2022-12-02

## 2022-12-02 VITALS
TEMPERATURE: 97.6 F | HEIGHT: 62 IN | DIASTOLIC BLOOD PRESSURE: 70 MMHG | BODY MASS INDEX: 37.54 KG/M2 | HEART RATE: 94 BPM | WEIGHT: 204 LBS | OXYGEN SATURATION: 98 % | RESPIRATION RATE: 14 BRPM | SYSTOLIC BLOOD PRESSURE: 120 MMHG

## 2022-12-02 DIAGNOSIS — Z02.4 ENCOUNTER FOR COMMERCIAL DRIVING LICENSE (CDL) EXAM: ICD-10-CM

## 2022-12-02 PROCEDURE — 7100 PR DOT PHYSICAL: Performed by: FAMILY MEDICINE

## 2022-12-03 NOTE — PROGRESS NOTES
"Subjective     Mercy Maloney is a 37 y.o. female who presents with Other (Cdl physical  )    See scanned history, physical  and clearance status in EPIC    Patient denies any history of seizures, dialysis, insulin use, pacemaker/defibrillator, syncope, arrhythmias/CAD/MI, vertigo/meniere's disease, illicit drug use, regular sedating medication use, ETOH abuse, or any weakness/paresthesias to extremities.     Cleared:  2 yrs    Renew: new     Corrective lenses:  none           HPI    ROS           Objective     /70   Pulse 94   Temp 36.4 °C (97.6 °F) (Temporal)   Resp 14   Ht 1.575 m (5' 2\")   Wt 92.5 kg (204 lb)   SpO2 98%   BMI 37.31 kg/m²      Physical Exam                        Assessment & Plan        There are no diagnoses linked to this encounter.                "

## 2024-11-25 ENCOUNTER — OFFICE VISIT (OUTPATIENT)
Dept: URGENT CARE | Facility: PHYSICIAN GROUP | Age: 39
End: 2024-11-25

## 2024-11-25 VITALS
HEIGHT: 63 IN | SYSTOLIC BLOOD PRESSURE: 122 MMHG | RESPIRATION RATE: 16 BRPM | TEMPERATURE: 97.4 F | OXYGEN SATURATION: 98 % | WEIGHT: 236 LBS | DIASTOLIC BLOOD PRESSURE: 74 MMHG | BODY MASS INDEX: 41.82 KG/M2 | HEART RATE: 68 BPM

## 2024-11-25 DIAGNOSIS — Z02.89 ENCOUNTER FOR EXAMINATION REQUIRED BY DEPARTMENT OF TRANSPORTATION (DOT): ICD-10-CM

## 2024-11-25 RX ORDER — ALBUTEROL SULFATE 90 UG/1
2 INHALANT RESPIRATORY (INHALATION) EVERY 6 HOURS PRN
COMMUNITY

## 2024-11-25 RX ORDER — CETIRIZINE HYDROCHLORIDE 10 MG/1
10 TABLET ORAL DAILY
COMMUNITY

## 2024-11-25 NOTE — PROGRESS NOTES
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 Past medical, family and social history reviewed. Pt has a stable heart murmur that she was dx with as a child, which is asymptomatic.  Not on any medications. See scanned in documentation.